# Patient Record
Sex: MALE | Race: WHITE | NOT HISPANIC OR LATINO | Employment: STUDENT | URBAN - METROPOLITAN AREA
[De-identification: names, ages, dates, MRNs, and addresses within clinical notes are randomized per-mention and may not be internally consistent; named-entity substitution may affect disease eponyms.]

---
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
## 2019-01-18 ENCOUNTER — TELEPHONE (OUTPATIENT)
Dept: PEDIATRIC NEUROLOGY | Facility: CLINIC | Age: 19
End: 2019-01-18

## 2019-01-18 NOTE — TELEPHONE ENCOUNTER
Telephoned mom she informed me Roger started to have seizures again.I offered appt 1/23@11a   Mom accept and voiced appt time and date

## 2019-01-18 NOTE — TELEPHONE ENCOUNTER
----- Message from Alexis Scanlon sent at 1/18/2019  8:10 AM CST -----  Contact: Mom [phone]  Needs Advice    Reason for call: seizures         Communication Preference: Mom [phone]    Additional Information: Mom stated that pt has started to have seizures again and feels that he needs to be seen. Mom would like a call back when possible.

## 2019-01-23 ENCOUNTER — OFFICE VISIT (OUTPATIENT)
Dept: PEDIATRIC NEUROLOGY | Facility: CLINIC | Age: 19
End: 2019-01-23
Payer: COMMERCIAL

## 2019-01-23 VITALS
SYSTOLIC BLOOD PRESSURE: 142 MMHG | BODY MASS INDEX: 20.59 KG/M2 | WEIGHT: 152 LBS | DIASTOLIC BLOOD PRESSURE: 82 MMHG | HEIGHT: 72 IN | HEART RATE: 80 BPM

## 2019-01-23 DIAGNOSIS — G40.909 SEIZURE DISORDER: Primary | ICD-10-CM

## 2019-01-23 PROCEDURE — 99214 PR OFFICE/OUTPT VISIT, EST, LEVL IV, 30-39 MIN: ICD-10-PCS | Mod: S$GLB,,, | Performed by: PSYCHIATRY & NEUROLOGY

## 2019-01-23 PROCEDURE — 99214 OFFICE O/P EST MOD 30 MIN: CPT | Mod: S$GLB,,, | Performed by: PSYCHIATRY & NEUROLOGY

## 2019-01-23 PROCEDURE — 99999 PR PBB SHADOW E&M-EST. PATIENT-LVL III: CPT | Mod: PBBFAC,,, | Performed by: PSYCHIATRY & NEUROLOGY

## 2019-01-23 PROCEDURE — 99999 PR PBB SHADOW E&M-EST. PATIENT-LVL III: ICD-10-PCS | Mod: PBBFAC,,, | Performed by: PSYCHIATRY & NEUROLOGY

## 2019-01-23 RX ORDER — LAMOTRIGINE 25 MG/1
TABLET ORAL
Qty: 120 TABLET | Refills: 11 | Status: SHIPPED | OUTPATIENT
Start: 2019-01-23 | End: 2019-03-01

## 2019-01-23 NOTE — PROGRESS NOTES
January 23, 2019    Thomas Roach M.D.  [address]  [place]  [address]    Jose Chao M.D.  [address], [address]  [place]  [address]    RE:  JAMES SARMIENTO  [facility]  [id number]     Dear Doctors Zhanna and Marley:    I saw James Sarmiento in followup at [facility] on January 23, 2019.  I have last   seen him for his seizure disorder in May 2017 and he has been seizure free for   three years and we tapered and discontinued Dilantin.  He was seizure free until   one week ago when late at night he had a generalized clonic seizure, lasting   about 2 minutes.  He states that he also had an aura in December 2018.  In the   past, his MRI and EEG have been normal.  He did not tolerate Keppra, Zonegran or   Tegretol and he said that Dilantin made him tired.  Vision, hearing, speech,   swallowing, strength and coordination are normal.  He has a great deal of   trouble with anxiety and panic attacks and sees Psychiatry and takes Cymbalta,   folate and fish oil.  He has graduated high school, but has not been able to   keep a job because of his anxiety.  No other illness, surgery, medication,   allergy or injury.    Immunizations are up-to-date.  No family history of seizures.  He lives with   both parents who are employed.    GENERAL REVIEW OF SYSTEMS:  Shows otherwise normal constitution, head, eyes,   ears, nose, throat, mouth, heart, lungs, GI, , skin, musculoskeletal,   neurologic, psychiatric, endocrine, hematologic and immune function.    PHYSICAL EXAMINATION:  VITAL SIGNS:  Weight 68.95 kilograms, height 184 cm, blood pressure 142/82,   pulse 80.  GENERAL:  Normal body habitus.  HEAD, EYES, EARS, NOSE AND THROAT:  Normal.   NECK:  Supple.  No mass.  CHEST:  Clear, no murmurs.  ABDOMEN:  Benign.  NEUROLOGIC:  Appropriate orientation, attention, language, knowledge and memory   for age.  Cranial nerves intact with normal fundi, pupils, eye movements, facial   movements,  hearing, neck and trapezius strength and tongue protrusion.  Deep   tendon reflexes are 2+, no pathologic reflexes.  Muscle tone and strength normal   in all four extremities.  Normal gait, no ataxia or intention tremor.    Sensation intact distally to touch.    In summary, Roger Putnam has had a return of his seizure disorder after four   years seizure free and off medicine.  I have ordered a repeat EEG.  I have   placed him on Lamictal 25 mg twice daily for two weeks, then 50 mg twice daily.    We will raise the dose further over time.  I cautioned Roger and his mother to   discontinue Lamictal if there is a generalized rash.  They have my card and were   asked to return or call as need be.  I will plan to see him when he returns   shortly for his EEG.    Sincerely,      RIGO  dd: 01/23/2019 11:11:55 (CST)  td: 01/24/2019 04:45:36 (CST)  Doc ID   #[id number]  Job ID #[id number]    CC:     This office note has been dictated.

## 2019-01-24 ENCOUNTER — TELEPHONE (OUTPATIENT)
Dept: PEDIATRIC NEUROLOGY | Facility: CLINIC | Age: 19
End: 2019-01-24

## 2019-01-24 NOTE — TELEPHONE ENCOUNTER
Telephoned mom to inform her Roger can not take xanax before eeg is will effect the reading  Mom voiced understanding

## 2019-01-24 NOTE — TELEPHONE ENCOUNTER
----- Message from Charity Hare sent at 1/24/2019  4:19 PM CST -----  Needs Advice    Reason for call:--Appointment--        Communication Preference:--Mom--[phone]--    Additional Information:Mom calling to see if pt can take a xanax before his EEG tomorrow. Please call to advise.

## 2019-01-25 ENCOUNTER — PROCEDURE VISIT (OUTPATIENT)
Dept: PEDIATRIC NEUROLOGY | Facility: CLINIC | Age: 19
End: 2019-01-25
Payer: COMMERCIAL

## 2019-01-25 ENCOUNTER — OFFICE VISIT (OUTPATIENT)
Dept: PEDIATRIC NEUROLOGY | Facility: CLINIC | Age: 19
End: 2019-01-25
Payer: COMMERCIAL

## 2019-01-25 VITALS
SYSTOLIC BLOOD PRESSURE: 144 MMHG | BODY MASS INDEX: 20.57 KG/M2 | DIASTOLIC BLOOD PRESSURE: 82 MMHG | HEART RATE: 86 BPM | HEIGHT: 72 IN | WEIGHT: 151.88 LBS

## 2019-01-25 DIAGNOSIS — F41.9 ANXIETY: ICD-10-CM

## 2019-01-25 DIAGNOSIS — G40.909 SEIZURE DISORDER: Primary | ICD-10-CM

## 2019-01-25 DIAGNOSIS — R94.01 ABNORMAL EEG: ICD-10-CM

## 2019-01-25 PROCEDURE — 95816 PR EEG,W/AWAKE & DROWSY RECORD: ICD-10-PCS | Mod: S$GLB,,, | Performed by: PSYCHIATRY & NEUROLOGY

## 2019-01-25 PROCEDURE — 99999 PR PBB SHADOW E&M-EST. PATIENT-LVL III: ICD-10-PCS | Mod: PBBFAC,,, | Performed by: PSYCHIATRY & NEUROLOGY

## 2019-01-25 PROCEDURE — 99999 PR PBB SHADOW E&M-EST. PATIENT-LVL III: CPT | Mod: PBBFAC,,, | Performed by: PSYCHIATRY & NEUROLOGY

## 2019-01-25 PROCEDURE — 99214 OFFICE O/P EST MOD 30 MIN: CPT | Mod: S$GLB,,, | Performed by: PSYCHIATRY & NEUROLOGY

## 2019-01-25 PROCEDURE — 99214 PR OFFICE/OUTPT VISIT, EST, LEVL IV, 30-39 MIN: ICD-10-PCS | Mod: S$GLB,,, | Performed by: PSYCHIATRY & NEUROLOGY

## 2019-01-25 PROCEDURE — 95816 EEG AWAKE AND DROWSY: CPT | Mod: S$GLB,,, | Performed by: PSYCHIATRY & NEUROLOGY

## 2019-01-25 NOTE — LETTER
January 25, 2019                   [facility]  Pediatric Neurology  [address]  [facility] [address]  Phone: [phone]   January 25, 2019     Patient: Roger Putnam   YOB: 2000   Date of Visit: 1/25/2019       To Whom it May Concern:    Roger Putnam was seen in my clinic on 1/25/2019. He may return to school on 1/28/2019.    If you have any questions or concerns, please don't hesitate to call.    Sincerely,         Foreign Fletcher MA

## 2019-01-25 NOTE — PROCEDURES
DATE OF PROCEDURE:  01/25/2019    A waking EEG with photic stimulation and hyperventilation is submitted.  The   waking posterior rhythm is 9 cycles per second.  Photic stimulation and   hyperventilation are unremarkable.  Sleep is not seen.  There are two brief   generalized bursts of 3-4 cycle per second spike and slow wave activity, but no   clinical seizures were noted.    IMPRESSION:  Abnormal EEG due to generalized spike and wave activity as   described, suggesting a generalized epilepsy.      RIGO  dd: 01/25/2019 15:19:39 (CST)  td: 01/25/2019 19:49:34 (CST)  Doc ID   #[id number]  Job ID #[id number]    CC:     This office note has been dictated.

## 2019-01-25 NOTE — PROGRESS NOTES
January 25, 2019    Thomas Roach M.D.  [address]  [place]  [address]    Jose Chao M.D.  [facility] [facility], [facility] [address]  [place]  [address]    RE:  JAMES SARMIENTO  [facility].:  [id number]    Dear Doctors Marley and Zhanna:    I saw James Sarmiento in followup at [facility] on January 25, 2019.  I last saw him   two days ago after he had a recurrence of his seizures, generalized clonic event   lasting about 2 minutes that occurred 10 days ago.  His MRI and EEG have been   normal in the past and he did not tolerate Keppra, Zonegran or Tegretol or   Dilantin.  He was placed on Lamictal, which he is now taking 25 mg twice daily   and will go up in two weeks to 50 mg twice daily.  He thinks this is helping his   anxiety and sleep.  He is also taking Cymbalta and Xanax for anxiety.  Today,   an EEG was done, which I reviewed personally:  This showed a single generalized   burst of 3-4 cycle per second spike and wave activity, consistent with a   generalized epilepsy.  No other intercurrent illness, surgery, medication,   allergy or injury.  No family history of neurologic disease.  He lives with both   parents.    GENERAL REVIEW OF SYSTEMS:  Shows otherwise normal constitution, head, eyes,   ears, nose, throat, mouth, heart, lungs, GI, , skin, musculoskeletal,   neurologic, psychiatric, endocrine, hematologic and immune function.    PHYSICAL EXAMINATION:  VITAL SIGNS:  Weight 68.9 kilograms, height 6 feet 0 inches, pulse 86, blood   pressure 144/82.  HEAD, EYES, EARS, NOSE AND THROAT:  Normal.  NECK:  Supple.  No mass.  CHEST:  Clear, no murmurs.  ABDOMEN:  Benign.  NEUROLOGIC:  Appropriate orientation, attention, language, knowledge and memory   for age.  Cranial nerves intact with normal fundi, pupils, eye movements, facial   movements, hearing, neck and trapezius strength and tongue protrusion.  Deep   tendon reflexes 2+, no pathologic reflexes.  Muscle tone and strength normal  in   all four extremities.  Normal gait, no ataxia or intention tremor.  Sensation   intact to touch.    In summary, Roger Putnam seems to be tolerating Lamictal 25 mg twice daily and   will go up in dose in about two weeks.  This seems to have helped his anxiety.    His EEG did show a generalized burst of spike and wave activity.  I have asked   him to come back to clinic in one month when we will further increase his dose   of Lamictal.    Sincerely,      RIGO  dd: 01/25/2019 15:16:50 (CST)  td: 01/26/2019 09:30:55 (CST)  Doc ID   #[id number]  Job ID #[id number]    CC:     This office note has been dictated.

## 2019-03-01 ENCOUNTER — OFFICE VISIT (OUTPATIENT)
Dept: PEDIATRIC NEUROLOGY | Facility: CLINIC | Age: 19
End: 2019-03-01
Payer: COMMERCIAL

## 2019-03-01 VITALS
HEIGHT: 72 IN | BODY MASS INDEX: 20.71 KG/M2 | DIASTOLIC BLOOD PRESSURE: 86 MMHG | WEIGHT: 152.88 LBS | SYSTOLIC BLOOD PRESSURE: 146 MMHG | HEART RATE: 72 BPM

## 2019-03-01 DIAGNOSIS — G40.909 SEIZURE DISORDER: Primary | ICD-10-CM

## 2019-03-01 PROCEDURE — 99999 PR PBB SHADOW E&M-EST. PATIENT-LVL III: CPT | Mod: PBBFAC,,, | Performed by: PSYCHIATRY & NEUROLOGY

## 2019-03-01 PROCEDURE — 99214 OFFICE O/P EST MOD 30 MIN: CPT | Mod: S$GLB,,, | Performed by: PSYCHIATRY & NEUROLOGY

## 2019-03-01 PROCEDURE — 99999 PR PBB SHADOW E&M-EST. PATIENT-LVL III: ICD-10-PCS | Mod: PBBFAC,,, | Performed by: PSYCHIATRY & NEUROLOGY

## 2019-03-01 PROCEDURE — 99214 PR OFFICE/OUTPT VISIT, EST, LEVL IV, 30-39 MIN: ICD-10-PCS | Mod: S$GLB,,, | Performed by: PSYCHIATRY & NEUROLOGY

## 2019-03-01 RX ORDER — LAMOTRIGINE 100 MG/1
TABLET ORAL
Qty: 60 TABLET | Refills: 11 | Status: SHIPPED | OUTPATIENT
Start: 2019-03-01 | End: 2019-09-05 | Stop reason: SDUPTHER

## 2019-03-01 NOTE — PROGRESS NOTES
03/01/2019    Thomas Roach M.D.  [address]  [place]  [address]    RE:  JAMES PUTNAM  [facility]  [id number]    Dear Dr. Roach:    I saw James Putnam in followup for his epilepsy on 03/01/2019.  He is now up to   50 mg twice daily of Lamictal for the last two weeks and then remained seizure   free.  This has apparently improved his anxiety as well.  He is also taking   folate and Cymbalta.  No other illness, surgery, medication, allergy or injury.    No family history of neurologic disease.  He lives with both parents.    GENERAL REVIEW OF SYSTEMS:  Shows otherwise normal constitution, head, eyes,   ears, nose, throat, mouth, heart, lungs, GI, , skin, musculoskeletal,   neurologic, psychiatric, endocrine, hematologic and immune function.    PHYSICAL EXAMINATION:  VITAL SIGNS:  Weight 69.35 kilograms, height 183 cm, blood pressure 146/86.  GENERAL:  Normal body habitus.  HEAD, EYES, EARS, NOSE AND THROAT:  Normal.  NECK:  Supple.  No mass.  CHEST:  Clear, no murmurs.  ABDOMEN:  Benign.  NEUROLOGIC:  Appropriate orientation, attention, language, knowledge and memory   for age.  Cranial nerves intact with normal fundi, pupils, eye movements, facial   movements, hearing, neck and trapezius strength and tongue protrusion.  Deep   tendon reflexes 2+ throughout, no pathologic reflexes.  Muscle tone and strength   normal in all four extremities.  Normal gait, no ataxia or intention tremor.    Sensation intact to touch.    In summary, James Putnam is doing well on Lamictal.  I have raised his dose to   what I think will be a reasonable maintenance dose of 100 mg twice daily.  I   have again warned the family to stop the drug if a generalized rash occurs.  I   will see him back in six months or sooner if there are problems.    Sincerely,      RIGO  dd: 03/01/2019 14:25:56 (CST)  td: 03/02/2019 06:31:08 (CST)  Doc ID   #[id number]  Job ID #[id number]    CC:     This office note has been  dictated.

## 2019-09-05 ENCOUNTER — OFFICE VISIT (OUTPATIENT)
Dept: PEDIATRIC NEUROLOGY | Facility: CLINIC | Age: 19
End: 2019-09-05
Payer: COMMERCIAL

## 2019-09-05 ENCOUNTER — LAB VISIT (OUTPATIENT)
Dept: LAB | Facility: HOSPITAL | Age: 19
End: 2019-09-05
Attending: PSYCHIATRY & NEUROLOGY
Payer: COMMERCIAL

## 2019-09-05 VITALS
SYSTOLIC BLOOD PRESSURE: 135 MMHG | HEIGHT: 72 IN | WEIGHT: 162.94 LBS | BODY MASS INDEX: 22.07 KG/M2 | DIASTOLIC BLOOD PRESSURE: 73 MMHG | HEART RATE: 77 BPM

## 2019-09-05 DIAGNOSIS — G40.909 SEIZURE DISORDER: Primary | ICD-10-CM

## 2019-09-05 DIAGNOSIS — G40.909 SEIZURE DISORDER: ICD-10-CM

## 2019-09-05 PROCEDURE — 99214 OFFICE O/P EST MOD 30 MIN: CPT | Mod: S$GLB,,, | Performed by: PSYCHIATRY & NEUROLOGY

## 2019-09-05 PROCEDURE — 80175 DRUG SCREEN QUAN LAMOTRIGINE: CPT

## 2019-09-05 PROCEDURE — 99999 PR PBB SHADOW E&M-EST. PATIENT-LVL III: ICD-10-PCS | Mod: PBBFAC,,, | Performed by: PSYCHIATRY & NEUROLOGY

## 2019-09-05 PROCEDURE — 36415 COLL VENOUS BLD VENIPUNCTURE: CPT

## 2019-09-05 PROCEDURE — 99214 PR OFFICE/OUTPT VISIT, EST, LEVL IV, 30-39 MIN: ICD-10-PCS | Mod: S$GLB,,, | Performed by: PSYCHIATRY & NEUROLOGY

## 2019-09-05 PROCEDURE — 99999 PR PBB SHADOW E&M-EST. PATIENT-LVL III: CPT | Mod: PBBFAC,,, | Performed by: PSYCHIATRY & NEUROLOGY

## 2019-09-05 RX ORDER — LAMOTRIGINE 100 MG/1
TABLET ORAL
Qty: 60 TABLET | Refills: 11 | Status: SHIPPED | OUTPATIENT
Start: 2019-09-05 | End: 2020-03-06 | Stop reason: SDUPTHER

## 2019-09-05 RX ORDER — AMOXICILLIN 500 MG
CAPSULE ORAL DAILY
COMMUNITY

## 2019-09-05 NOTE — PROGRESS NOTES
September 5, 2019    Thomas Roach M.D.  [address]   [place] [address]    Jose Chao MD  [address] [address]    RE:  Roger Putnam  [facility]:  [id number].    Dear Doctors Marley and Zhanna:     I saw Roger Putnam in followup for his primary generalized epilepsy (normal MRI   and exam, EEG with generalized spike and wave activity).  He has now remained   seizure free since January, taking Lamictal 100 mg twice daily.  In the past, he   failed Keppra, Zonegran, Tegretol and Dilantin.  He is planning to go to [employer]   for the winter semester.  He is seeing Psychiatry and taking folate and   Cymbalta.  No other reported illness, surgery, medication, allergy or injury.    No family history of neurologic disease.  He lives with both parents in   [place].      GENERAL REVIEW OF SYSTEMS:  Shows otherwise normal constitution, head, eyes,   ears, nose, throat, mouth, heart, lungs, GI, , skin, musculoskeletal,   neurologic, psychiatric, endocrine, hematologic and immune function.    PHYSICAL EXAMINATION:  VITAL SIGNS:  Weight 73.90 kg, height 183 cm, blood pressure 135/73.  GENERAL:  Normal body habitus.  HEAD, EYES, EARS, NOSE AND THROAT:  Normal.  NECK:  Supple.  No mass.  CHEST:  Clear.  No murmurs.  ABDOMEN:  Benign.  NEUROLOGIC:  Appropriate orientation, attention, language, knowledge and memory   for age.  Cranial nerves intact with normal fundi, pupils, eye movements, facial   movements, hearing, neck and trapezius strength and tongue protrusion.  Deep   tendon reflexes 2+, no pathologic reflexes.  Muscle tone and strength normal in   all four extremities.  Normal gait, no ataxia or intention tremor.  Sensation   intact to touch.    In summary, Roger Putnam is doing nicely on Lamictal 100 mg twice daily with no   seizures since January.  I have continued this medication, ordered a drug   level, and asked that he return in 6  months.    Sincerely,      PORTER/IN  dd: 09/05/2019 14:29:30 (CDT)  td: 09/06/2019 03:54:41 (CDT)  Doc ID   #[id number]  Job ID #[id number]    CC:     This office note has been dictated.

## 2019-09-07 LAB — LAMOTRIGINE SERPL-MCNC: 2.6 UG/ML (ref 2–15)

## 2019-12-12 ENCOUNTER — OFFICE VISIT (OUTPATIENT)
Dept: FAMILY MEDICINE | Facility: CLINIC | Age: 19
End: 2019-12-12
Payer: COMMERCIAL

## 2019-12-12 VITALS
TEMPERATURE: 98 F | DIASTOLIC BLOOD PRESSURE: 82 MMHG | OXYGEN SATURATION: 98 % | HEART RATE: 71 BPM | SYSTOLIC BLOOD PRESSURE: 118 MMHG | WEIGHT: 160.19 LBS | BODY MASS INDEX: 21.69 KG/M2

## 2019-12-12 DIAGNOSIS — F41.9 ANXIETY: ICD-10-CM

## 2019-12-12 DIAGNOSIS — Z00.00 ANNUAL PHYSICAL EXAM: ICD-10-CM

## 2019-12-12 DIAGNOSIS — G40.309 NONINTRACTABLE GENERALIZED IDIOPATHIC EPILEPSY WITHOUT STATUS EPILEPTICUS: Primary | ICD-10-CM

## 2019-12-12 DIAGNOSIS — Z23 NEED FOR PROPHYLACTIC VACCINATION AND INOCULATION AGAINST INFLUENZA: ICD-10-CM

## 2019-12-12 PROCEDURE — 90471 FLU VACCINE (QUAD) GREATER THAN OR EQUAL TO 3YO PRESERVATIVE FREE IM: ICD-10-PCS | Mod: S$GLB,,, | Performed by: INTERNAL MEDICINE

## 2019-12-12 PROCEDURE — 90472 IMMUNIZATION ADMIN EACH ADD: CPT | Mod: S$GLB,,, | Performed by: INTERNAL MEDICINE

## 2019-12-12 PROCEDURE — 99999 PR PBB SHADOW E&M-EST. PATIENT-LVL III: ICD-10-PCS | Mod: PBBFAC,,, | Performed by: INTERNAL MEDICINE

## 2019-12-12 PROCEDURE — 99999 PR PBB SHADOW E&M-EST. PATIENT-LVL III: CPT | Mod: PBBFAC,,, | Performed by: INTERNAL MEDICINE

## 2019-12-12 PROCEDURE — 99385 PREV VISIT NEW AGE 18-39: CPT | Mod: 25,S$GLB,, | Performed by: INTERNAL MEDICINE

## 2019-12-12 PROCEDURE — 99385 PR PREVENTIVE VISIT,NEW,18-39: ICD-10-PCS | Mod: 25,S$GLB,, | Performed by: INTERNAL MEDICINE

## 2019-12-12 PROCEDURE — 90686 FLU VACCINE (QUAD) GREATER THAN OR EQUAL TO 3YO PRESERVATIVE FREE IM: ICD-10-PCS | Mod: S$GLB,,, | Performed by: INTERNAL MEDICINE

## 2019-12-12 PROCEDURE — 90686 IIV4 VACC NO PRSV 0.5 ML IM: CPT | Mod: S$GLB,,, | Performed by: INTERNAL MEDICINE

## 2019-12-12 PROCEDURE — 90714 TD VACCINE GREATER THAN OR EQUAL TO 7YO PRESERVATIVE FREE IM: ICD-10-PCS | Mod: S$GLB,,, | Performed by: INTERNAL MEDICINE

## 2019-12-12 PROCEDURE — 90714 TD VACC NO PRESV 7 YRS+ IM: CPT | Mod: S$GLB,,, | Performed by: INTERNAL MEDICINE

## 2019-12-12 PROCEDURE — 90472 TD VACCINE GREATER THAN OR EQUAL TO 7YO PRESERVATIVE FREE IM: ICD-10-PCS | Mod: S$GLB,,, | Performed by: INTERNAL MEDICINE

## 2019-12-12 PROCEDURE — 90471 IMMUNIZATION ADMIN: CPT | Mod: S$GLB,,, | Performed by: INTERNAL MEDICINE

## 2019-12-12 RX ORDER — LEVOMEFOLATE CALCIUM 15 MG
15 TABLET ORAL DAILY
Qty: 90 TABLET | Refills: 3 | Status: SHIPPED | OUTPATIENT
Start: 2019-12-12 | End: 2020-12-07 | Stop reason: SDUPTHER

## 2019-12-12 RX ORDER — DULOXETIN HYDROCHLORIDE 30 MG/1
30 CAPSULE, DELAYED RELEASE ORAL DAILY
Qty: 90 CAPSULE | Refills: 3 | Status: SHIPPED | OUTPATIENT
Start: 2019-12-12 | End: 2020-12-07 | Stop reason: SDUPTHER

## 2019-12-12 NOTE — PROGRESS NOTES
[facility] Note    Chief Complaint      Chief Complaint   Patient presents with    Establish Care       History of Present Illness      Roger Putnam is a 19 y.o. male with chronic conditions of epilepsy, depression, ADHD who presents today for: establish care and annual preventative visit.  Epilepsy: Sees Dr. Mack, pediatric neurology.  On lamotrigine.  No seizure since starting lamictal 1/17/2019.    Depression, anxiety, ADHD: Sees Dr. Chao.  On cymbalta, folic acid and xanax as needed.  Doing well on this regimen.    Diet: Prepares own food mostly.  Limiting fatty foods and carbs.  Drinks plenty water.    Exercise: runs 2.5 miles 4-5x/week.  Denies drinking and driving, drinking more than 4 drinks on occasion, drug use.     Flu shot due.  Td 2009, due for repeat.      Past Medical History:  Past Medical History:   Diagnosis Date    ADHD (attention deficit hyperactivity disorder)     Panic attack     Seizures     Social anxiety disorder        Past Surgical History:   has no past surgical history on file.    Family History:  family history includes Alcohol abuse in his maternal grandmother; Arthritis in his mother; Asthma in his brother; COPD in his paternal grandmother; Cancer in his father; Drug abuse in his paternal uncle; Miscarriages / Stillbirths in his mother.     Social History:  Social History     Tobacco Use    Smoking status: Never Smoker    Smokeless tobacco: Never Used   Substance Use Topics    Alcohol use: No     Alcohol/week: 0.0 standard drinks     Frequency: Monthly or less     Drinks per session: 1 or 2     Binge frequency: Never    Drug use: No       Review of Systems   Constitutional: Negative for chills, fever and malaise/fatigue.   HENT: Negative for hearing loss.    Eyes: Negative for discharge.   Respiratory: Negative for shortness of breath and wheezing.    Cardiovascular: Negative for chest pain and palpitations.   Gastrointestinal: Negative for blood in  stool, constipation, diarrhea, nausea and vomiting.   Genitourinary: Negative for hematuria and urgency.   Musculoskeletal: Negative for neck pain.   Skin: Negative for rash.   Neurological: Negative for weakness and headaches.   Endo/Heme/Allergies: Negative for polydipsia.        Medications:  Outpatient Encounter Medications as of 12/12/2019   Medication Sig Dispense Refill    alprazolam (XANAX) 0.25 MG tablet Take 0.25 mg by mouth 3 (three) times daily. Take only when needed for Anxiety      DULoxetine (CYMBALTA) 30 MG capsule Take 1 capsule (30 mg total) by mouth once daily. 90 capsule 3    fish oil-omega-3 fatty acids 300-1,000 mg capsule Take by mouth once daily.      fluticasone (FLONASE) 50 mcg/actuation nasal spray       lamoTRIgine (LAMICTAL) 100 MG tablet One twice daily.  Stop if a rash occurs 60 tablet 11    levomefolate calcium (L-METHYLFOLATE) 15 mg Tab Take 15 mg by mouth once daily. 90 tablet 3    [DISCONTINUED] duloxetine (CYMBALTA) 20 MG capsule Take 30 mg by mouth once daily.       [DISCONTINUED] levomefolate calcium (L-METHYLFOLATE) 15 mg Tab Take 15 mg by mouth once daily.       No facility-administered encounter medications on file as of 12/12/2019.        Allergies:  Review of patient's allergies indicates:  No Known Allergies    Health Maintenance:  Immunization History   Administered Date(s) Administered    DTaP (5 Pertussis Antigens) 2000, 2000, 1998, 11/22/1999, 07/30/2002    HIB 2000, 2000, 1998, 11/22/1999    Hepatitis A, Pediatric/Adolescent, 2 Dose 06/24/2016    Hepatitis B 2000, 2000, 02/18/1999    IPV 1998, 1998, 07/30/2002    MMR 04/26/1999, 07/30/2002    Meningococcal Conjugate (MCV4P) 07/20/2009, 06/24/2016    OPV 04/26/1999    PPD Test 02/18/1999    Tdap 07/20/2009    Varicella 04/26/1999, 07/20/2009      Health Maintenance   Topic Date Due    Lipid Panel  1998    HPV Vaccines (1 - Male  3-dose series) 04/02/2013    TETANUS VACCINE  04/02/2016        Physical Exam      Vital Signs  Temp: 98.1 °F (36.7 °C)  Temp src: Oral  Pulse: 71  SpO2: 98 %  BP: 118/82  BP Location: Right arm  Patient Position: Sitting  Pain Score: 0-No pain  Height and Weight  Weight: 72.7 kg (160 lb 2.6 oz)]    Physical Exam   Constitutional: He appears well-developed and well-nourished.   HENT:   Head: Normocephalic and atraumatic.   Right Ear: External ear normal.   Left Ear: External ear normal.   Mouth/Throat: Oropharynx is clear and moist.   Eyes: Pupils are equal, round, and reactive to light. Conjunctivae and EOM are normal.   Cardiovascular: Normal rate, regular rhythm, normal heart sounds and intact distal pulses.   No murmur heard.  Pulmonary/Chest: Effort normal and breath sounds normal. He has no wheezes. He has no rales.   Abdominal: Soft. Bowel sounds are normal. He exhibits no distension and no abdominal bruit. There is no hepatosplenomegaly. There is no tenderness.   Vitals reviewed.       Laboratory:  CBC:      CMP:        Invalid input(s): CREATININ  URINALYSIS:       LIPIDS:      TSH:      A1C:        Assessment/Plan     Roger Putnam is a 19 y.o.male with:    1. Annual physical exam  Discussed diet and exercise, vaccines and cancer screening, risk factors.  Screening labs ordered.     2. Nonintractable generalized idiopathic epilepsy without status epilepticus  Continue current meds.  F/U with Dr. Mack  3. Anxiety  - levomefolate calcium (L-METHYLFOLATE) 15 mg Tab; Take 15 mg by mouth once daily.  Dispense: 90 tablet; Refill: 3  - DULoxetine (CYMBALTA) 30 MG capsule; Take 1 capsule (30 mg total) by mouth once daily.  Dispense: 90 capsule; Refill: 3  Continue current meds.    4. Need for prophylactic vaccination and inoculation against influenza  - Flu Vaccine - Quadrivalent (PF) (6 months & older)  - Td Vaccine (Adult) - Preservative Free       Chronic conditions status updated as per HPI.  Other than  changes above, cont current medications and maintain follow up with specialists.  Return to clinic in 12 months.    Devin Frias MD  [facility]                Answers for HPI/ROS submitted by the patient on 12/11/2019   activity change: No  unexpected weight change: No  rhinorrhea: No  trouble swallowing: No  visual disturbance: No  chest tightness: No  polyuria: No  difficulty urinating: No  joint swelling: No  arthralgias: No  confusion: No  dysphoric mood: No    Flu consent signed by patient. Flu vaccine administered.

## 2020-03-06 ENCOUNTER — OFFICE VISIT (OUTPATIENT)
Dept: PEDIATRIC NEUROLOGY | Facility: CLINIC | Age: 20
End: 2020-03-06
Payer: COMMERCIAL

## 2020-03-06 VITALS
HEIGHT: 72 IN | HEART RATE: 72 BPM | BODY MASS INDEX: 21.48 KG/M2 | SYSTOLIC BLOOD PRESSURE: 139 MMHG | DIASTOLIC BLOOD PRESSURE: 84 MMHG | WEIGHT: 158.63 LBS

## 2020-03-06 DIAGNOSIS — G40.909 SEIZURE DISORDER: Primary | ICD-10-CM

## 2020-03-06 PROCEDURE — 99214 PR OFFICE/OUTPT VISIT, EST, LEVL IV, 30-39 MIN: ICD-10-PCS | Mod: S$GLB,,, | Performed by: PSYCHIATRY & NEUROLOGY

## 2020-03-06 PROCEDURE — 99214 OFFICE O/P EST MOD 30 MIN: CPT | Mod: S$GLB,,, | Performed by: PSYCHIATRY & NEUROLOGY

## 2020-03-06 PROCEDURE — 99999 PR PBB SHADOW E&M-EST. PATIENT-LVL III: CPT | Mod: PBBFAC,,, | Performed by: PSYCHIATRY & NEUROLOGY

## 2020-03-06 PROCEDURE — 99999 PR PBB SHADOW E&M-EST. PATIENT-LVL III: ICD-10-PCS | Mod: PBBFAC,,, | Performed by: PSYCHIATRY & NEUROLOGY

## 2020-03-06 RX ORDER — LAMOTRIGINE 100 MG/1
TABLET ORAL
Qty: 60 TABLET | Refills: 11 | Status: SHIPPED | OUTPATIENT
Start: 2020-03-06 | End: 2021-03-22 | Stop reason: SDUPTHER

## 2020-03-06 NOTE — PROGRESS NOTES
Dictation #1  MRN:[id number]  [facility]:[id number]  Pediatric Neurology Clinic note 03/06/2020  Roger Putnam date of birth 2000    This is a 19-year-old with an inactive primary generalized epilepsy:  Normal MRI of the cranium and an EEG with generalized spike and wave activity.  He is now taking Lamictal 100 mg twice daily and has had no seizures in 1 year.  In the past he was treated with Keppra zonegram Tegretol and Dilantin.  He is in college.  He is taking Cymbalta and folate.  No other inter current illness surgery medication allergy or injury.  No family history of neurologic disease.  He lives with his parents.  General review of systems is benign.    Weight 71.95 kg height 183.5 cm blood pressure 139/84 normal body habitus.  Head eyes ears nose and throat were normal.  Neck is supple no masses chest clear no murmurs abdomen benign.  Appropriate mental state for age.  Cranial nerves intact with normal fundi pupils eye movements facial movements hearing neck trapezius strength and tongue protrusion.  His deep tendon reflexes are 2+, no pathologic reflexes.  Muscle tone and strength normal. Normal gait, no ataxia.  Sensation intact to touch.    I will continue Lamictal 100 mg twice daily and see him back in 1 year, or sooner if there are problems--Victor Manuel Mack MD

## 2020-12-07 ENCOUNTER — OFFICE VISIT (OUTPATIENT)
Dept: PRIMARY CARE CLINIC | Facility: CLINIC | Age: 20
End: 2020-12-07
Payer: COMMERCIAL

## 2020-12-07 VITALS
HEIGHT: 72 IN | BODY MASS INDEX: 21.35 KG/M2 | SYSTOLIC BLOOD PRESSURE: 116 MMHG | OXYGEN SATURATION: 99 % | DIASTOLIC BLOOD PRESSURE: 80 MMHG | HEART RATE: 73 BPM | WEIGHT: 157.63 LBS

## 2020-12-07 DIAGNOSIS — F90.9 ATTENTION DEFICIT HYPERACTIVITY DISORDER (ADHD), UNSPECIFIED ADHD TYPE: ICD-10-CM

## 2020-12-07 DIAGNOSIS — Z00.00 ANNUAL PHYSICAL EXAM: Primary | ICD-10-CM

## 2020-12-07 DIAGNOSIS — G40.309 NONINTRACTABLE GENERALIZED IDIOPATHIC EPILEPSY WITHOUT STATUS EPILEPTICUS: ICD-10-CM

## 2020-12-07 DIAGNOSIS — F41.9 ANXIETY: ICD-10-CM

## 2020-12-07 PROCEDURE — 99999 PR PBB SHADOW E&M-EST. PATIENT-LVL III: CPT | Mod: PBBFAC,,, | Performed by: INTERNAL MEDICINE

## 2020-12-07 PROCEDURE — 99213 OFFICE O/P EST LOW 20 MIN: CPT | Mod: PBBFAC,PN | Performed by: INTERNAL MEDICINE

## 2020-12-07 PROCEDURE — 99999 PR PBB SHADOW E&M-EST. PATIENT-LVL III: ICD-10-PCS | Mod: PBBFAC,,, | Performed by: INTERNAL MEDICINE

## 2020-12-07 PROCEDURE — 99395 PREV VISIT EST AGE 18-39: CPT | Mod: S$PBB,,, | Performed by: INTERNAL MEDICINE

## 2020-12-07 PROCEDURE — 99395 PR PREVENTIVE VISIT,EST,18-39: ICD-10-PCS | Mod: S$PBB,,, | Performed by: INTERNAL MEDICINE

## 2020-12-07 RX ORDER — LEVOMEFOLATE CALCIUM 15 MG
15 TABLET ORAL DAILY
Qty: 90 TABLET | Refills: 3 | Status: SHIPPED | OUTPATIENT
Start: 2020-12-07 | End: 2021-12-21 | Stop reason: SDUPTHER

## 2020-12-07 RX ORDER — DULOXETIN HYDROCHLORIDE 30 MG/1
30 CAPSULE, DELAYED RELEASE ORAL DAILY
Qty: 90 CAPSULE | Refills: 3 | Status: SHIPPED | OUTPATIENT
Start: 2020-12-07 | End: 2021-12-08 | Stop reason: SDUPTHER

## 2020-12-07 NOTE — PROGRESS NOTES
[facility] Note    Chief Complaint      Chief Complaint   Patient presents with    Annual Exam       History of Present Illness      Roger Putnam is a 20 y.o. male with chronic conditions of seizure disorder, anxiety, ADHD who presents today for: annual preventative visit.   Epilepsy: Sees Dr. Mack, pediatric neurology.  On lamotrigine.  No seizure since starting lamictal 1/17/2019.    Depression, anxiety, ADHD: Previously seen Dr. Chao, psychiatrist, Dr. Reyes, psychology.  On cymbalta, folic acid and xanax as needed.  Doing well on this regimen.    Diet: Prepares own food mostly.  Limiting fatty foods and carbs.  Drinks plenty water.    Exercise: runs 3 miles 4-5x/week.  Denies drinking and driving, drinking more than 4 drinks on occasion, drug use.     Flu shot UTD.  Td 2019.     Past Medical History:  Past Medical History:   Diagnosis Date    ADHD (attention deficit hyperactivity disorder)     Panic attack     Seizures     Social anxiety disorder        Past Surgical History:   has no past surgical history on file.    Family History:  family history includes Alcohol abuse in his maternal grandmother; Arthritis in his mother; Asthma in his brother; COPD in his paternal grandmother; Cancer in his father; Drug abuse in his paternal uncle; Miscarriages / Stillbirths in his mother.     Social History:  Social History     Tobacco Use    Smoking status: Never Smoker    Smokeless tobacco: Never Used   Substance Use Topics    Alcohol use: No     Alcohol/week: 0.0 standard drinks     Frequency: Monthly or less     Drinks per session: 1 or 2     Binge frequency: Never    Drug use: No       I personally reviewed all past medical, surgical, social and family history.    Review of Systems   Constitutional: Negative for chills, fever and malaise/fatigue.   Respiratory: Negative for shortness of breath.    Cardiovascular: Negative for chest pain.   Gastrointestinal: Negative for constipation,  diarrhea, nausea and vomiting.   Skin: Negative for rash.   Neurological: Negative for weakness.   All other systems reviewed and are negative.       Medications:  Outpatient Encounter Medications as of 12/7/2020   Medication Sig Dispense Refill    alprazolam (XANAX) 0.25 MG tablet Take 0.25 mg by mouth 3 (three) times daily. Take only when needed for Anxiety      DULoxetine (CYMBALTA) 30 MG capsule Take 1 capsule (30 mg total) by mouth once daily. 90 capsule 3    fish oil-omega-3 fatty acids 300-1,000 mg capsule Take by mouth once daily.      fluticasone (FLONASE) 50 mcg/actuation nasal spray       lamoTRIgine (LAMICTAL) 100 MG tablet One twice daily.  Stop if a rash occurs 60 tablet 11    levomefolate calcium (L-METHYLFOLATE) 15 mg Tab Take 15 mg by mouth once daily. 90 tablet 3     No facility-administered encounter medications on file as of 12/7/2020.        Allergies:  Review of patient's allergies indicates:  No Known Allergies    Health Maintenance:  Immunization History   Administered Date(s) Administered    DTaP (5 Pertussis Antigens) 1998, 1998, 1998, 11/22/1999, 07/30/2002    HIB 1998, 1998, 1998, 11/22/1999    Hepatitis A, Pediatric/Adolescent, 2 Dose 06/24/2016    Hepatitis B 1998, 1998, 02/18/1999    IPV 1998, 1998, 07/30/2002    Influenza - Quadrivalent - PF *Preferred* (6 months and older) 12/12/2019, 09/07/2020    MMR 04/26/1999, 07/30/2002    Meningococcal Conjugate (MCV4P) 07/20/2009, 06/24/2016    OPV 04/26/1999    PPD Test 02/18/1999    Td - PF (ADULT) 12/12/2019    Tdap 07/20/2009    Varicella 04/26/1999, 07/20/2009      Health Maintenance   Topic Date Due    Hepatitis C Screening  1998    Lipid Panel  1998    HPV Vaccines (1 - Male 2-dose series) 04/02/2009    TETANUS VACCINE  12/12/2029        Physical Exam      Vital Signs  Pulse: 73  SpO2: 99 %  BP: 116/80  BP Location: Right arm  Height and  Weight  Height: 6' (182.9 cm)  Weight: 71.5 kg (157 lb 10.1 oz)  BSA (Calculated - sq m): 1.91 sq meters  BMI (Calculated): 21.4  Weight in (lb) to have BMI = 25: 183.9]    Physical Exam  Vitals signs reviewed.   Constitutional:       Appearance: He is well-developed.   HENT:      Head: Normocephalic and atraumatic.      Right Ear: External ear normal.      Left Ear: External ear normal.   Eyes:      Conjunctiva/sclera: Conjunctivae normal.      Pupils: Pupils are equal, round, and reactive to light.   Cardiovascular:      Rate and Rhythm: Normal rate and regular rhythm.      Heart sounds: Normal heart sounds. No murmur.   Pulmonary:      Effort: Pulmonary effort is normal.      Breath sounds: Normal breath sounds. No wheezing or rales.   Abdominal:      General: Bowel sounds are normal. There is no distension or abdominal bruit.      Palpations: Abdomen is soft.      Tenderness: There is no abdominal tenderness.          Laboratory:  CBC:      CMP:        Invalid input(s): CREATININ  URINALYSIS:       LIPIDS:      TSH:      A1C:        Assessment/Plan     Roger Putnam is a 20 y.o.male with:    1. Annual physical exam  Discussed diet and exercise, vaccines and cancer screening, risk factors.  Screening labs deferred.  2. Nonintractable generalized idiopathic epilepsy without status epilepticus  Continue current meds.  F/U with neurology  3. Anxiety  4. Attention deficit hyperactivity disorder (ADHD), unspecified ADHD type  Continue current meds.  F/U with psychology.     Chronic conditions status updated as per HPI.  Other than changes above, cont current medications and maintain follow up with specialists.  Return to clinic in 12 months.    Devin Frias MD  [facility]

## 2021-03-22 ENCOUNTER — PATIENT MESSAGE (OUTPATIENT)
Dept: PRIMARY CARE CLINIC | Facility: CLINIC | Age: 21
End: 2021-03-22

## 2021-03-22 DIAGNOSIS — G40.909 SEIZURE DISORDER: ICD-10-CM

## 2021-03-22 RX ORDER — LAMOTRIGINE 100 MG/1
100 TABLET ORAL 2 TIMES DAILY
Qty: 60 TABLET | Refills: 4 | Status: SHIPPED | OUTPATIENT
Start: 2021-03-22 | End: 2021-06-28 | Stop reason: SDUPTHER

## 2021-03-23 ENCOUNTER — PATIENT MESSAGE (OUTPATIENT)
Dept: PRIMARY CARE CLINIC | Facility: CLINIC | Age: 21
End: 2021-03-23

## 2021-03-27 ENCOUNTER — IMMUNIZATION (OUTPATIENT)
Dept: PRIMARY CARE CLINIC | Facility: CLINIC | Age: 21
End: 2021-03-27
Payer: COMMERCIAL

## 2021-03-27 DIAGNOSIS — Z23 NEED FOR VACCINATION: Primary | ICD-10-CM

## 2021-03-27 PROCEDURE — 0001A PR IMMUNIZ ADMIN, SARS-COV-2 COVID-19 VACC, 30MCG/0.3ML, 1ST DOSE: ICD-10-PCS | Mod: CV19,S$GLB,, | Performed by: INTERNAL MEDICINE

## 2021-03-27 PROCEDURE — 0001A PR IMMUNIZ ADMIN, SARS-COV-2 COVID-19 VACC, 30MCG/0.3ML, 1ST DOSE: CPT | Mod: CV19,S$GLB,, | Performed by: INTERNAL MEDICINE

## 2021-03-27 PROCEDURE — 91300 PR SARS-COV- 2 COVID-19 VACCINE, NO PRSV, 30MCG/0.3ML, IM: ICD-10-PCS | Mod: S$GLB,,, | Performed by: INTERNAL MEDICINE

## 2021-03-27 PROCEDURE — 91300 PR SARS-COV- 2 COVID-19 VACCINE, NO PRSV, 30MCG/0.3ML, IM: CPT | Mod: S$GLB,,, | Performed by: INTERNAL MEDICINE

## 2021-03-27 RX ADMIN — Medication 0.3 ML: at 02:03

## 2021-04-17 ENCOUNTER — IMMUNIZATION (OUTPATIENT)
Dept: PRIMARY CARE CLINIC | Facility: CLINIC | Age: 21
End: 2021-04-17
Payer: COMMERCIAL

## 2021-04-17 DIAGNOSIS — Z23 NEED FOR VACCINATION: Primary | ICD-10-CM

## 2021-04-17 PROCEDURE — 91300 PR SARS-COV- 2 COVID-19 VACCINE, NO PRSV, 30MCG/0.3ML, IM: ICD-10-PCS | Mod: S$GLB,,, | Performed by: INTERNAL MEDICINE

## 2021-04-17 PROCEDURE — 0002A PR IMMUNIZ ADMIN, SARS-COV-2 COVID-19 VACC, 30MCG/0.3ML, 2ND DOSE: ICD-10-PCS | Mod: CV19,S$GLB,, | Performed by: INTERNAL MEDICINE

## 2021-04-17 PROCEDURE — 0002A PR IMMUNIZ ADMIN, SARS-COV-2 COVID-19 VACC, 30MCG/0.3ML, 2ND DOSE: CPT | Mod: CV19,S$GLB,, | Performed by: INTERNAL MEDICINE

## 2021-04-17 PROCEDURE — 91300 PR SARS-COV- 2 COVID-19 VACCINE, NO PRSV, 30MCG/0.3ML, IM: CPT | Mod: S$GLB,,, | Performed by: INTERNAL MEDICINE

## 2021-04-17 RX ADMIN — Medication 0.3 ML: at 10:04

## 2021-06-25 ENCOUNTER — PATIENT OUTREACH (OUTPATIENT)
Dept: ADMINISTRATIVE | Facility: OTHER | Age: 21
End: 2021-06-25

## 2021-06-28 ENCOUNTER — OFFICE VISIT (OUTPATIENT)
Dept: NEUROLOGY | Facility: CLINIC | Age: 21
End: 2021-06-28
Payer: COMMERCIAL

## 2021-06-28 DIAGNOSIS — G40.909 SEIZURE DISORDER: ICD-10-CM

## 2021-06-28 PROCEDURE — 99205 OFFICE O/P NEW HI 60 MIN: CPT | Mod: 95,,, | Performed by: PSYCHIATRY & NEUROLOGY

## 2021-06-28 PROCEDURE — 99205 PR OFFICE/OUTPT VISIT, NEW, LEVL V, 60-74 MIN: ICD-10-PCS | Mod: 95,,, | Performed by: PSYCHIATRY & NEUROLOGY

## 2021-06-28 RX ORDER — LAMOTRIGINE 100 MG/1
100 TABLET ORAL 2 TIMES DAILY
Qty: 180 TABLET | Refills: 3 | Status: SHIPPED | OUTPATIENT
Start: 2021-06-28 | End: 2022-05-25 | Stop reason: SDUPTHER

## 2021-12-08 ENCOUNTER — PATIENT MESSAGE (OUTPATIENT)
Dept: INTERNAL MEDICINE | Facility: CLINIC | Age: 21
End: 2021-12-08
Payer: COMMERCIAL

## 2021-12-08 DIAGNOSIS — F41.9 ANXIETY: ICD-10-CM

## 2021-12-08 RX ORDER — DULOXETIN HYDROCHLORIDE 30 MG/1
30 CAPSULE, DELAYED RELEASE ORAL DAILY
Qty: 90 CAPSULE | Refills: 3 | Status: SHIPPED | OUTPATIENT
Start: 2021-12-08 | End: 2021-12-09

## 2021-12-09 DIAGNOSIS — F41.9 ANXIETY: ICD-10-CM

## 2021-12-09 RX ORDER — DULOXETIN HYDROCHLORIDE 30 MG/1
CAPSULE, DELAYED RELEASE ORAL
Qty: 90 CAPSULE | Refills: 3 | Status: SHIPPED | OUTPATIENT
Start: 2021-12-09 | End: 2023-02-24

## 2021-12-21 ENCOUNTER — OFFICE VISIT (OUTPATIENT)
Dept: INTERNAL MEDICINE | Facility: CLINIC | Age: 21
End: 2021-12-21
Payer: COMMERCIAL

## 2021-12-21 VITALS
OXYGEN SATURATION: 98 % | SYSTOLIC BLOOD PRESSURE: 132 MMHG | HEART RATE: 72 BPM | BODY MASS INDEX: 21.78 KG/M2 | HEIGHT: 72 IN | TEMPERATURE: 99 F | DIASTOLIC BLOOD PRESSURE: 80 MMHG | WEIGHT: 160.81 LBS

## 2021-12-21 DIAGNOSIS — Z00.00 ANNUAL PHYSICAL EXAM: Primary | ICD-10-CM

## 2021-12-21 DIAGNOSIS — F41.9 ANXIETY: ICD-10-CM

## 2021-12-21 DIAGNOSIS — G40.309 NONINTRACTABLE GENERALIZED IDIOPATHIC EPILEPSY WITHOUT STATUS EPILEPTICUS: ICD-10-CM

## 2021-12-21 DIAGNOSIS — F90.9 ATTENTION DEFICIT HYPERACTIVITY DISORDER (ADHD), UNSPECIFIED ADHD TYPE: ICD-10-CM

## 2021-12-21 DIAGNOSIS — Z79.899 HIGH RISK MEDICATION USE: ICD-10-CM

## 2021-12-21 PROCEDURE — 99395 PR PREVENTIVE VISIT,EST,18-39: ICD-10-PCS | Mod: S$GLB,,, | Performed by: INTERNAL MEDICINE

## 2021-12-21 PROCEDURE — 99999 PR PBB SHADOW E&M-EST. PATIENT-LVL IV: CPT | Mod: PBBFAC,,, | Performed by: INTERNAL MEDICINE

## 2021-12-21 PROCEDURE — 99999 PR PBB SHADOW E&M-EST. PATIENT-LVL IV: ICD-10-PCS | Mod: PBBFAC,,, | Performed by: INTERNAL MEDICINE

## 2021-12-21 PROCEDURE — 99395 PREV VISIT EST AGE 18-39: CPT | Mod: S$GLB,,, | Performed by: INTERNAL MEDICINE

## 2021-12-21 RX ORDER — LEVOMEFOLATE CALCIUM 15 MG
15 TABLET ORAL DAILY
Qty: 90 TABLET | Refills: 3 | Status: SHIPPED | OUTPATIENT
Start: 2021-12-21 | End: 2021-12-27

## 2022-01-11 ENCOUNTER — LAB VISIT (OUTPATIENT)
Dept: LAB | Facility: HOSPITAL | Age: 22
End: 2022-01-11
Attending: INTERNAL MEDICINE
Payer: COMMERCIAL

## 2022-01-11 DIAGNOSIS — Z79.899 HIGH RISK MEDICATION USE: ICD-10-CM

## 2022-01-11 DIAGNOSIS — G40.309 NONINTRACTABLE GENERALIZED IDIOPATHIC EPILEPSY WITHOUT STATUS EPILEPTICUS: ICD-10-CM

## 2022-01-11 DIAGNOSIS — Z00.00 ANNUAL PHYSICAL EXAM: ICD-10-CM

## 2022-01-11 LAB
ALBUMIN SERPL BCP-MCNC: 4.4 G/DL (ref 3.5–5.2)
ALP SERPL-CCNC: 60 U/L (ref 55–135)
ALT SERPL W/O P-5'-P-CCNC: 12 U/L (ref 10–44)
ANION GAP SERPL CALC-SCNC: 9 MMOL/L (ref 8–16)
AST SERPL-CCNC: 21 U/L (ref 10–40)
BASOPHILS # BLD AUTO: 0.02 K/UL (ref 0–0.2)
BASOPHILS NFR BLD: 0.3 % (ref 0–1.9)
BILIRUB SERPL-MCNC: 0.9 MG/DL (ref 0.1–1)
BUN SERPL-MCNC: 13 MG/DL (ref 6–20)
CALCIUM SERPL-MCNC: 10 MG/DL (ref 8.7–10.5)
CHLORIDE SERPL-SCNC: 102 MMOL/L (ref 95–110)
CHOLEST SERPL-MCNC: 155 MG/DL (ref 120–199)
CHOLEST/HDLC SERPL: 3.2 {RATIO} (ref 2–5)
CO2 SERPL-SCNC: 29 MMOL/L (ref 23–29)
CREAT SERPL-MCNC: 0.9 MG/DL (ref 0.5–1.4)
DIFFERENTIAL METHOD: ABNORMAL
EOSINOPHIL # BLD AUTO: 0.1 K/UL (ref 0–0.5)
EOSINOPHIL NFR BLD: 1.2 % (ref 0–8)
ERYTHROCYTE [DISTWIDTH] IN BLOOD BY AUTOMATED COUNT: 11.9 % (ref 11.5–14.5)
EST. GFR  (AFRICAN AMERICAN): >60 ML/MIN/1.73 M^2
EST. GFR  (NON AFRICAN AMERICAN): >60 ML/MIN/1.73 M^2
GLUCOSE SERPL-MCNC: 95 MG/DL (ref 70–110)
HCT VFR BLD AUTO: 51.1 % (ref 40–54)
HDLC SERPL-MCNC: 49 MG/DL (ref 40–75)
HDLC SERPL: 31.6 % (ref 20–50)
HGB BLD-MCNC: 17.6 G/DL (ref 14–18)
IMM GRANULOCYTES # BLD AUTO: 0.01 K/UL (ref 0–0.04)
IMM GRANULOCYTES NFR BLD AUTO: 0.2 % (ref 0–0.5)
LDLC SERPL CALC-MCNC: 90.8 MG/DL (ref 63–159)
LYMPHOCYTES # BLD AUTO: 1.4 K/UL (ref 1–4.8)
LYMPHOCYTES NFR BLD: 24.3 % (ref 18–48)
MCH RBC QN AUTO: 32.2 PG (ref 27–31)
MCHC RBC AUTO-ENTMCNC: 34.4 G/DL (ref 32–36)
MCV RBC AUTO: 93 FL (ref 82–98)
MONOCYTES # BLD AUTO: 0.4 K/UL (ref 0.3–1)
MONOCYTES NFR BLD: 7.1 % (ref 4–15)
NEUTROPHILS # BLD AUTO: 3.9 K/UL (ref 1.8–7.7)
NEUTROPHILS NFR BLD: 66.9 % (ref 38–73)
NONHDLC SERPL-MCNC: 106 MG/DL
NRBC BLD-RTO: 0 /100 WBC
PLATELET # BLD AUTO: 237 K/UL (ref 150–450)
PMV BLD AUTO: 11.7 FL (ref 9.2–12.9)
POTASSIUM SERPL-SCNC: 4.7 MMOL/L (ref 3.5–5.1)
PROT SERPL-MCNC: 7.3 G/DL (ref 6–8.4)
RBC # BLD AUTO: 5.47 M/UL (ref 4.6–6.2)
SODIUM SERPL-SCNC: 140 MMOL/L (ref 136–145)
TRIGL SERPL-MCNC: 76 MG/DL (ref 30–150)
WBC # BLD AUTO: 5.77 K/UL (ref 3.9–12.7)

## 2022-01-11 PROCEDURE — 85025 COMPLETE CBC W/AUTO DIFF WBC: CPT | Performed by: INTERNAL MEDICINE

## 2022-01-11 PROCEDURE — 36415 COLL VENOUS BLD VENIPUNCTURE: CPT | Performed by: INTERNAL MEDICINE

## 2022-01-11 PROCEDURE — 80061 LIPID PANEL: CPT | Performed by: INTERNAL MEDICINE

## 2022-01-11 PROCEDURE — 80053 COMPREHEN METABOLIC PANEL: CPT | Performed by: INTERNAL MEDICINE

## 2022-01-11 PROCEDURE — 80175 DRUG SCREEN QUAN LAMOTRIGINE: CPT | Performed by: INTERNAL MEDICINE

## 2022-01-13 ENCOUNTER — PATIENT MESSAGE (OUTPATIENT)
Dept: INTERNAL MEDICINE | Facility: CLINIC | Age: 22
End: 2022-01-13
Payer: COMMERCIAL

## 2022-01-14 LAB — LAMOTRIGINE SERPL-MCNC: 2.8 UG/ML (ref 2–15)

## 2022-04-13 ENCOUNTER — OFFICE VISIT (OUTPATIENT)
Dept: URGENT CARE | Facility: CLINIC | Age: 22
End: 2022-04-13
Payer: COMMERCIAL

## 2022-04-13 VITALS
WEIGHT: 160 LBS | DIASTOLIC BLOOD PRESSURE: 77 MMHG | OXYGEN SATURATION: 97 % | RESPIRATION RATE: 18 BRPM | HEART RATE: 76 BPM | BODY MASS INDEX: 21.67 KG/M2 | TEMPERATURE: 98 F | SYSTOLIC BLOOD PRESSURE: 122 MMHG | HEIGHT: 72 IN

## 2022-04-13 DIAGNOSIS — J32.9 SINUSITIS, UNSPECIFIED CHRONICITY, UNSPECIFIED LOCATION: ICD-10-CM

## 2022-04-13 DIAGNOSIS — R05.9 COUGH: Primary | ICD-10-CM

## 2022-04-13 LAB
CTP QC/QA: YES
POC MOLECULAR INFLUENZA A AGN: NEGATIVE
POC MOLECULAR INFLUENZA B AGN: NEGATIVE

## 2022-04-13 PROCEDURE — 3074F PR MOST RECENT SYSTOLIC BLOOD PRESSURE < 130 MM HG: ICD-10-PCS | Mod: CPTII,S$GLB,,

## 2022-04-13 PROCEDURE — 87502 POCT INFLUENZA A/B MOLECULAR: ICD-10-PCS | Mod: QW,S$GLB,,

## 2022-04-13 PROCEDURE — 3078F PR MOST RECENT DIASTOLIC BLOOD PRESSURE < 80 MM HG: ICD-10-PCS | Mod: CPTII,S$GLB,,

## 2022-04-13 PROCEDURE — 3074F SYST BP LT 130 MM HG: CPT | Mod: CPTII,S$GLB,,

## 2022-04-13 PROCEDURE — 3008F PR BODY MASS INDEX (BMI) DOCUMENTED: ICD-10-PCS | Mod: CPTII,S$GLB,,

## 2022-04-13 PROCEDURE — 87502 INFLUENZA DNA AMP PROBE: CPT | Mod: QW,S$GLB,,

## 2022-04-13 PROCEDURE — 3078F DIAST BP <80 MM HG: CPT | Mod: CPTII,S$GLB,,

## 2022-04-13 PROCEDURE — 99213 PR OFFICE/OUTPT VISIT, EST, LEVL III, 20-29 MIN: ICD-10-PCS | Mod: S$GLB,,,

## 2022-04-13 PROCEDURE — 99213 OFFICE O/P EST LOW 20 MIN: CPT | Mod: S$GLB,,,

## 2022-04-13 PROCEDURE — 1159F MED LIST DOCD IN RCRD: CPT | Mod: CPTII,S$GLB,,

## 2022-04-13 PROCEDURE — 1159F PR MEDICATION LIST DOCUMENTED IN MEDICAL RECORD: ICD-10-PCS | Mod: CPTII,S$GLB,,

## 2022-04-13 PROCEDURE — 3008F BODY MASS INDEX DOCD: CPT | Mod: CPTII,S$GLB,,

## 2022-04-13 NOTE — PATIENT INSTRUCTIONS
"                                                          Sinusitis   If your condition worsens or fails to improve we recommend that you receive another evaluation at the ER immediately or contact your PCP to discuss your concerns or return here. You must understand that you've received an urgent care treatment only and that you may be released before all your medical problems are known or treated. You the patient will arrange for followup care as instructed.   If we discussed that I think your illness is viral it will not respond to antibiotics and it will last 10-14 days. However, if over the next few days the symptoms worsen start the antibiotics I have given you.   -  Flonase (fluticasone) is a nasal spray which is available over the counter and may help with your symptoms   -  If you have hypertension avoid using the "D" which is the decongestant. Instead, you can use Coricidin HBP for your cold and cough symptoms.      -  If you just have a congested feeling you can take pseudoephedrine (unless you have high blood pressure) which you have to sign for behind the counter. Do not buy the phenylephrine which is on the shelf as it is not effective   -  Rest and fluids are also important.   -  Tylenol or ibuprofen can also be used as directed for pain unless you have an allergy to them or medical condition such as stomach ulcers, kidney or liver disease or blood thinners etc for which you should not be taking these type of medications.   -  If you are flying in the next few days Afrin nose drops for the airplane flight upon take off and landing may help. Other than at those times refrain from using afrin.    Take tessalon as prescribed.  "

## 2022-04-13 NOTE — PROGRESS NOTES
Subjective:       Patient ID: Roger Putnam is a 22 y.o. male.    Vitals:  height is 6' (1.829 m) and weight is 72.6 kg (160 lb). His temperature is 97.8 °F (36.6 °C). His blood pressure is 122/77 and his pulse is 76. His respiration is 18 and oxygen saturation is 97%.     Chief Complaint: Cough    Pt stated that he has been sick since Monday . Pt stated that he took a home covid test and it was negative . Pt stated that he does not want to be tested for the flu or anything else . Pts pharmacy has been updated .     Cough  This is a new problem. The current episode started in the past 7 days. The problem has been unchanged. The problem occurs every few minutes. The cough is non-productive. Associated symptoms include a fever, headaches, myalgias, nasal congestion, rhinorrhea and a sore throat. Pertinent negatives include no chest pain, chills, ear congestion, ear pain, heartburn, hemoptysis, postnasal drip, rash, shortness of breath, sweats, weight loss or wheezing. Nothing aggravates the symptoms. He has tried OTC cough suppressant for the symptoms. The treatment provided no relief.       Constitution: Positive for fever. Negative for activity change, appetite change, chills, sweating, fatigue, unexpected weight change and generalized weakness.   HENT: Positive for sinus pain, sinus pressure and sore throat. Negative for ear pain, ear discharge, facial swelling, congestion, postnasal drip and trouble swallowing.    Neck: Negative for neck pain, neck stiffness and neck swelling.   Cardiovascular: Negative for chest pain, leg swelling, palpitations, sob on exertion and passing out.   Respiratory: Positive for cough. Negative for sleep apnea, chest tightness, sputum production, bloody sputum, COPD, shortness of breath, stridor, wheezing and asthma.    Gastrointestinal: Negative.  Negative for abdominal pain, nausea, vomiting, constipation, diarrhea and heartburn.   Endocrine: hair loss.   Musculoskeletal: Positive for  muscle ache. Negative for pain and trauma.        Reports bodyaches last night.   Skin: Negative for rash and hives.   Allergic/Immunologic: Negative for asthma and hives.   Neurological: Positive for headaches. Negative for dizziness, light-headedness, loss of balance, disorientation, altered mental status, numbness, tingling and seizures.   Hematologic/Lymphatic: Negative for history of blood clots and history of bleeding disorder.   Psychiatric/Behavioral: Negative for altered mental status, disorientation and confusion.       Objective:      Physical Exam   Constitutional: He is oriented to person, place, and time. He appears well-developed. He is cooperative.  Non-toxic appearance. He does not appear ill. No distress.   HENT:   Head: Normocephalic and atraumatic.   Ears:   Right Ear: Hearing, tympanic membrane, external ear and ear canal normal.   Left Ear: Hearing, tympanic membrane, external ear and ear canal normal.   Nose: Mucosal edema present. No rhinorrhea or nasal deformity. No epistaxis. Right sinus exhibits maxillary sinus tenderness. Right sinus exhibits no frontal sinus tenderness. Left sinus exhibits maxillary sinus tenderness. Left sinus exhibits no frontal sinus tenderness.   Mouth/Throat: Uvula is midline, oropharynx is clear and moist and mucous membranes are normal. No trismus in the jaw. Normal dentition. No uvula swelling. No oropharyngeal exudate, posterior oropharyngeal edema or posterior oropharyngeal erythema.   Eyes: Conjunctivae and lids are normal. No scleral icterus.   Neck: Trachea normal and phonation normal. Neck supple. No edema present. No erythema present. No neck rigidity present.   Cardiovascular: Normal rate, regular rhythm, normal heart sounds and normal pulses.   Pulmonary/Chest: Effort normal and breath sounds normal. No respiratory distress. He has no decreased breath sounds. He has no rhonchi.   Abdominal: Normal appearance.   Musculoskeletal: Normal range of motion.          General: No deformity. Normal range of motion.   Neurological: He is alert and oriented to person, place, and time. He exhibits normal muscle tone. Coordination normal.   Skin: Skin is warm, dry, intact, not diaphoretic and not pale.   Psychiatric: His speech is normal and behavior is normal. Judgment and thought content normal.   Nursing note and vitals reviewed.  Discussed with PT sinusitis likely caused by virus. Discussed with PT  if condition worsens or fails to improve that PT receive another evaluation at the ER immediately or contact your PCP to discuss your concerns or return here.Reviewed to take Flonase as directed by medication label directions. Take tessalon as prescribed Also discussed was rest and fluids as well as Tylenol or ibuprofen can also be used as directed for pain or fever. PT verbalized understanding and agreed with plan and diagnosis.  Results for orders placed or performed in visit on 04/13/22   POCT Influenza A/B MOLECULAR   Result Value Ref Range    POC Molecular Influenza A Ag Negative Negative, Not Reported    POC Molecular Influenza B Ag Negative Negative, Not Reported     Acceptable Yes          Assessment:       1. Cough    2. Sinusitis, unspecified chronicity, unspecified location          Plan:         Cough  -     POCT Influenza A/B MOLECULAR    Sinusitis, unspecified chronicity, unspecified location              Additional MDM:     Heart Failure Score:   COPD = No

## 2022-05-25 ENCOUNTER — OFFICE VISIT (OUTPATIENT)
Dept: NEUROLOGY | Facility: CLINIC | Age: 22
End: 2022-05-25
Payer: COMMERCIAL

## 2022-05-25 DIAGNOSIS — G40.309 NONINTRACTABLE GENERALIZED IDIOPATHIC EPILEPSY WITHOUT STATUS EPILEPTICUS: Primary | ICD-10-CM

## 2022-05-25 DIAGNOSIS — G40.909 SEIZURE DISORDER: ICD-10-CM

## 2022-05-25 PROCEDURE — 99214 OFFICE O/P EST MOD 30 MIN: CPT | Mod: 95,,, | Performed by: PSYCHIATRY & NEUROLOGY

## 2022-05-25 PROCEDURE — 1160F RVW MEDS BY RX/DR IN RCRD: CPT | Mod: CPTII,95,, | Performed by: PSYCHIATRY & NEUROLOGY

## 2022-05-25 PROCEDURE — 99214 PR OFFICE/OUTPT VISIT, EST, LEVL IV, 30-39 MIN: ICD-10-PCS | Mod: 95,,, | Performed by: PSYCHIATRY & NEUROLOGY

## 2022-05-25 PROCEDURE — 1159F MED LIST DOCD IN RCRD: CPT | Mod: CPTII,95,, | Performed by: PSYCHIATRY & NEUROLOGY

## 2022-05-25 PROCEDURE — 1159F PR MEDICATION LIST DOCUMENTED IN MEDICAL RECORD: ICD-10-PCS | Mod: CPTII,95,, | Performed by: PSYCHIATRY & NEUROLOGY

## 2022-05-25 PROCEDURE — 1160F PR REVIEW ALL MEDS BY PRESCRIBER/CLIN PHARMACIST DOCUMENTED: ICD-10-PCS | Mod: CPTII,95,, | Performed by: PSYCHIATRY & NEUROLOGY

## 2022-05-25 RX ORDER — LAMOTRIGINE 100 MG/1
100 TABLET ORAL 2 TIMES DAILY
Qty: 180 TABLET | Refills: 3 | Status: SHIPPED | OUTPATIENT
Start: 2022-05-25 | End: 2023-05-01 | Stop reason: SDUPTHER

## 2022-05-25 RX ORDER — LISDEXAMFETAMINE DIMESYLATE CAPSULES 20 MG/1
20 CAPSULE ORAL EVERY MORNING
COMMUNITY
End: 2023-02-01 | Stop reason: SDUPTHER

## 2022-05-25 NOTE — PROGRESS NOTES
Name: Roger Putnam  MRN: [id number]   CSN: [id number]      Date: 05/25/2022    The patient location is: home  The chief complaint leading to consultation is: seizures  Visit type: audiovisual  Face to Face time with patient: 30 minutes of total time spent on the encounter, which includes face to face time and non-face to face time preparing to see the patient (eg, review of tests), Obtaining and/or reviewing separately obtained history, Documenting clinical information in the electronic or other health record, Independently interpreting results (not separately reported) and communicating results to the patient/family/caregiver, or Care coordination (not separately reported).   Each patient to whom he or she provides medical services by telemedicine is:  (1) informed of the relationship between the physician and patient and the respective role of any other health care provider with respect to management of the patient; and (2) notified that he or she may decline to receive medical services by telemedicine and may withdraw from such care at any time.    HISTORY OF PRESENT ILLNESS (HPI)  The patient is a 22 y.o. man presents for follow up for epilepsy.      Initial seizure was generalized convulsion in 2013, reports all seizures have been generalized convulsions.  He was started on medication at that time with several changes as below.  Routine EEG 2016 normal and routine EEG 2019 with generalized SW.  He was started on LTG January 2019 and has had no further seizures since starting this medication.  He is currently driving.    Interim History  Doing well with no further seizures, started vyvanse which has been helpful with academic work, currently enrolled in pre-pharmacy program    ED visits  Episodes of SE  Change in meds    Seizure Seminology  Seizure Type 1  Classification:   Aura -   Ictus  - Nonconv -  - Conv -  - Duration -   Post-ictal  - Symptoms  - Duration  Age of onset   Current Seizure Frequency -    Last  Seizure      sz per month 2019 2020 2021 2022 2023 2024   Jacques         Feb         Mar         Apr         May         Alhaji         Jul         Aug         Sep         Oct         Nov         Dec         Tot           Seizure Triggers  Sleep Deprivation -  None  Other medications -  None   Benadral   Tramadol   Other  Psych/stress -  None  Photic stimulation -  None  Hyperventilation -  None  Medical Problems -  None  Menses -   No  Sensory Stimulation    Light  No   Sound  No   Problem Solv No   Other  No  Missed dose of Rx None    AED Treatments  Present regimen  lamotrigine (Lamictal, LTG) 200mg/d (start 1/2019)    Prior treatments  carbamazepine (Tegretol, CBZ) 2013, ataxia  levetiracetam (Keppra, LEV) 2013, rage  phenytoin (Dilantin, PHT) 6060-6780, failed wean  zonisamide (Zonegran, ZNA)  2013, memory and balance    Not tried  acetazolamide (Diamox, AZM)  Amantadine  brivitiracetam (Briviact, BRV)  carisbamate (Xcorpi, XCP)  clobazam (Onfi or Frizium, CLB)  ethosuximide (Zarontin, ESM)  eslicarbazine (Aptiom, ESL)  felbamate (felbatol, FBM)  gabapentin (Neurontin, GPN)  lacosamide (Vimpat, LCS)   methsuximide (Celontin, MSM)  oxcarbazepine (Trileptal OXC)  perampanel (Fycompa, FCP)   phenobarbital (Pb)  pregabalin (Lyrica, PGB)  primidone (Mysoline, PRM)  rufinamide (Banzel, RUF)  tiagabine (Gabatril,  TGB)  topiramate (Topamax, TPM)  viagabatrin, (Sabril, VGB)  vagal nerve stimulator (VNS)  valproic acid (Depakote, VPA)    Benzodiazepines  diazepam - rectal (Diastatl)  diazepam - oral (Valium, DZ)  clonazepam (Klonopin, CZP)  clorazepate (Tranxene, CLZ)  Ativan  Brain Stimulation  Vagal Nerve Stimulation-n/a  DBS- n/a    Adherence/Compliance method  Memory - yes  Mom or Spouse - Yes  Pill Box - no  [place] calendar - no  Turn over medication bottle - no  Phone alarm - no    Seizure Evaluation  EEG Routine -   EEG Ambulatory -   EEG\Video Monitoring -   MRI/MRA -   CT/CTA Scan -   PET Scan -   Neuropsychological  evaluation -   DEXA Scan    Potential Epilepsy Risk Factors:   Pregnancy/Labor/Delivery - full term uncomplicated pregnancy labor and vaginal delivery  Febrile seizures - none  Head injury  - none  CNS infection - none     Stroke - none  Family Hx of Sz - none    PAST MEDICAL HISTORY:   Active Ambulatory Problems     Diagnosis Date Noted    Seizure 09/13/2013    Anxiety 09/13/2013    Epilepsy 06/09/2014    Social isolation 06/09/2014    Abnormal EEG 01/25/2019    Attention deficit hyperactivity disorder (ADHD) 12/07/2020     Resolved Ambulatory Problems     Diagnosis Date Noted    No Resolved Ambulatory Problems     Past Medical History:   Diagnosis Date    ADHD (attention deficit hyperactivity disorder)     Panic attack     Seizures     Social anxiety disorder         PAST SURGICAL HISTORY: No past surgical history on file.     FAMILY HISTORY:   Family History   Problem Relation Age of Onset    Alcohol abuse Maternal Grandmother     Arthritis Mother     Miscarriages / Stillbirths Mother     Asthma Brother     Cancer Father     COPD Paternal Grandmother     Drug abuse Paternal Uncle          SOCIAL HISTORY:   Social History     Socioeconomic History    Marital status: Single   Tobacco Use    Smoking status: Never Smoker    Smokeless tobacco: Never Used   Substance and Sexual Activity    Alcohol use: No     Alcohol/week: 0.0 standard drinks    Drug use: No    Sexual activity: Never     Social Determinants of Health     Financial Resource Strain: Low Risk     Difficulty of Paying Living Expenses: Not hard at all   Food Insecurity: No Food Insecurity    Worried About Running Out of Food in the Last Year: Never true    Ran Out of Food in the Last Year: Never true   Transportation Needs: No Transportation Needs    Lack of Transportation (Medical): No    Lack of Transportation (Non-Medical): No   Physical Activity: Sufficiently Active    Days of Exercise per Week: 5 days    Minutes of  Exercise per Session: 90 min   Social Connections: Unknown    Frequency of Communication with Friends and Family: Twice a week    Frequency of Social Gatherings with Friends and Family: Once a week    Active Member of Clubs or Organizations: Yes    Attends Club or Organization Meetings: More than 4 times per year    Marital Status: Never    Housing Stability: Low Risk     Unable to Pay for Housing in the Last Year: No    Number of Places Lived in the Last Year: 1    Unstable Housing in the Last Year: No        SUBSTANCE USE:  Social History     Tobacco Use    Smoking status: Never Smoker    Smokeless tobacco: Never Used   Substance and Sexual Activity    Alcohol use: No     Alcohol/week: 0.0 standard drinks    Drug use: No    Sexual activity: Never      Social History     Tobacco Use    Smoking status: Never Smoker    Smokeless tobacco: Never Used   Substance Use Topics    Alcohol use: No     Alcohol/week: 0.0 standard drinks        ALLERGIES: Patient has no known allergies.     There were no vitals taken for this visit.    Higher Cortical Function:    Patient is a well developed, pleasant, well groomed individual appearing their stated age  Oriented - intact to person, place and time and followed two step instruction correctly.    Fund of knowledge was appropriate.    R-L Orientation - Intact   Language - Speech was fluent without evidence for an aphasia.  Cranial Nerves II - XII:    EOMs were intact with normal smooth and no nystagmus.    PERRLA. D/C Visual fields were full to confrontation.    Motor - facial movement was symmetrical and normal.    Facial sensory - Light touch sensations were normal.    Hearing was normal to finger rub.  Palate moved well and was symmetrical with normal palatal and oral sensation.    Tongue movement was full   Normal power and bulk was found in the massiter and rotator muscles of the neck.  Motor: Power, bulk and tone were normal in all  extremities.  Sensory: Light touch senses were normal in all extremities.    Coordination:       Rapid alternating movements and rapid finger tapping - normal.    Gait:  Station, gait were done without difficulty and Romberg was negative.  Tremor: resting, postural, intentional - none     IMPRESSION  1. IGE        DISPOSITION:    1. Continue LTG 200mg/d    Follow up 12 months     2014 EPILEPSY QUALITY MEASUREMENT (AAN)  1 a. Seizure Frequency: noted  1b. Seizure Intervention: yes  2.                   a. Etiology: Genetic  b. Seizure Type: GTC  c. Epilepsy Syndrome: IGE  3.                   a. Side-effects of AED: no                        b. Intervention for side-effects: n/a  4. Screening for psychiatric or behavioral disorders: yes  5. Personalized epilepsy safety issues and education: yes  6. Counseling for women of child-bearing potential and epilepsy: n/a  7. Referral of treatment-resistant epilepsy to comprehensive epilepsy center (q 2 years): N/A.     The patient was asked to call me/the clinic 1 week after the test(s) are done to obtain results.  The following issues were  all discussed in detail with the patient and family/caregiver(s):     1. Diagnosis, plans, prognosis, medications and possible side-effects, risks and benefits of treatment, other alternatives to AEDs.  2. Risks related to continued seizures, status epilepticus, SUDEP, driving restrictions and seizure precautions (no baths but showers are ok, no swimming unsupervised, no use of heavy machinery, no use of sharp moving objects, avoid heights).   3. Issues related to pregnancy, OCP and breast feeding as it relates to epilepsy.  4. The potential of teratogenicity and suicidal risks of anti-epileptic medications.  5.Avoid any activity that compromise patient safety related to seizures.      Questions and concerns raised by the patient and family/care-giver(s) were addressed and they indicated understanding of everything discussed and agreed  to plans as above.

## 2023-02-01 ENCOUNTER — OFFICE VISIT (OUTPATIENT)
Dept: PRIMARY CARE CLINIC | Facility: CLINIC | Age: 23
End: 2023-02-01
Payer: COMMERCIAL

## 2023-02-01 VITALS
WEIGHT: 162.5 LBS | DIASTOLIC BLOOD PRESSURE: 80 MMHG | BODY MASS INDEX: 22.01 KG/M2 | HEIGHT: 72 IN | HEART RATE: 70 BPM | SYSTOLIC BLOOD PRESSURE: 138 MMHG

## 2023-02-01 DIAGNOSIS — Z00.00 ANNUAL PHYSICAL EXAM: Primary | ICD-10-CM

## 2023-02-01 DIAGNOSIS — G40.309 NONINTRACTABLE GENERALIZED IDIOPATHIC EPILEPSY WITHOUT STATUS EPILEPTICUS: ICD-10-CM

## 2023-02-01 DIAGNOSIS — F41.9 ANXIETY: ICD-10-CM

## 2023-02-01 DIAGNOSIS — F90.9 ATTENTION DEFICIT HYPERACTIVITY DISORDER (ADHD), UNSPECIFIED ADHD TYPE: ICD-10-CM

## 2023-02-01 PROCEDURE — 3079F DIAST BP 80-89 MM HG: CPT | Mod: CPTII,S$GLB,, | Performed by: INTERNAL MEDICINE

## 2023-02-01 PROCEDURE — 3075F PR MOST RECENT SYSTOLIC BLOOD PRESS GE 130-139MM HG: ICD-10-PCS | Mod: CPTII,S$GLB,, | Performed by: INTERNAL MEDICINE

## 2023-02-01 PROCEDURE — 99395 PREV VISIT EST AGE 18-39: CPT | Mod: S$GLB,,, | Performed by: INTERNAL MEDICINE

## 2023-02-01 PROCEDURE — 99395 PR PREVENTIVE VISIT,EST,18-39: ICD-10-PCS | Mod: S$GLB,,, | Performed by: INTERNAL MEDICINE

## 2023-02-01 PROCEDURE — 3008F BODY MASS INDEX DOCD: CPT | Mod: CPTII,S$GLB,, | Performed by: INTERNAL MEDICINE

## 2023-02-01 PROCEDURE — 99999 PR PBB SHADOW E&M-EST. PATIENT-LVL III: ICD-10-PCS | Mod: PBBFAC,,, | Performed by: INTERNAL MEDICINE

## 2023-02-01 PROCEDURE — 1159F MED LIST DOCD IN RCRD: CPT | Mod: CPTII,S$GLB,, | Performed by: INTERNAL MEDICINE

## 2023-02-01 PROCEDURE — 1159F PR MEDICATION LIST DOCUMENTED IN MEDICAL RECORD: ICD-10-PCS | Mod: CPTII,S$GLB,, | Performed by: INTERNAL MEDICINE

## 2023-02-01 PROCEDURE — 99999 PR PBB SHADOW E&M-EST. PATIENT-LVL III: CPT | Mod: PBBFAC,,, | Performed by: INTERNAL MEDICINE

## 2023-02-01 PROCEDURE — 3008F PR BODY MASS INDEX (BMI) DOCUMENTED: ICD-10-PCS | Mod: CPTII,S$GLB,, | Performed by: INTERNAL MEDICINE

## 2023-02-01 PROCEDURE — 3079F PR MOST RECENT DIASTOLIC BLOOD PRESSURE 80-89 MM HG: ICD-10-PCS | Mod: CPTII,S$GLB,, | Performed by: INTERNAL MEDICINE

## 2023-02-01 PROCEDURE — 3075F SYST BP GE 130 - 139MM HG: CPT | Mod: CPTII,S$GLB,, | Performed by: INTERNAL MEDICINE

## 2023-02-01 RX ORDER — LISDEXAMFETAMINE DIMESYLATE CAPSULES 20 MG/1
20 CAPSULE ORAL EVERY MORNING
Qty: 30 CAPSULE | Refills: 0 | Status: SHIPPED | OUTPATIENT
Start: 2023-02-01 | End: 2023-05-01

## 2023-02-01 NOTE — PROGRESS NOTES
[facility] Note    Chief Complaint      Chief Complaint   Patient presents with    Annual Exam       History of Present Illness      Roger Putnam is a 22 y.o. male with chronic conditions of ADHD, anxiety, epilepsy who presents today for: annual preventative visit.    Epilepsy: Previously saw Dr. Mack, pediatric neurology.  Sees DR. Santoro.  On lamotrigine.  No seizure since starting lamictal 1/17/2019.    Depression, anxiety, ADHD: Previously seen Dr. Chao, psychiatrist, Dr. Reyes, psychology.  On cymbalta, folic acid and xanax as needed.  Doing well on this regimen.    Diet: Prepares own food mostly.  Limiting fatty foods and carbs.  Drinks plenty water.    Exercise: runs 3 miles 2x/week.  Swims 2x/week.  Denies drinking and driving, drinking more than 4 drinks on occasion, drug use.     Flu shot UTD.  Td 2019. COVID vaccine UTD.    Past Medical History:  Past Medical History:   Diagnosis Date    ADHD (attention deficit hyperactivity disorder)     Panic attack     Seizures     Social anxiety disorder        Past Surgical History:   has no past surgical history on file.    Family History:  family history includes Alcohol abuse in his maternal grandmother; Arthritis in his mother; Asthma in his brother; COPD in his paternal grandmother; Cancer in his father; Drug abuse in his paternal uncle; Miscarriages / Stillbirths in his mother.     Social History:  Social History     Tobacco Use    Smoking status: Never    Smokeless tobacco: Never   Substance Use Topics    Alcohol use: No    Drug use: No       I personally reviewed all past medical, surgical, social and family history.    Review of Systems   Constitutional:  Negative for chills, fever and malaise/fatigue.   Respiratory:  Negative for shortness of breath.    Cardiovascular:  Negative for chest pain.   Gastrointestinal:  Negative for constipation, diarrhea, nausea and vomiting.   Skin:  Negative for rash.   Neurological:  Negative for  weakness.   All other systems reviewed and are negative.     Medications:  Outpatient Encounter Medications as of 2/1/2023   Medication Sig Dispense Refill    ALPRAZolam (XANAX) 0.25 MG tablet Take 0.25 mg by mouth 3 (three) times daily. Take only when needed for Anxiety      DULoxetine (CYMBALTA) 30 MG capsule TAKE ONE CAPSULE BY MOUTH EVERY DAY 90 capsule 3    fish oil-omega-3 fatty acids 300-1,000 mg capsule Take by mouth once daily.      fluticasone (FLONASE) 50 mcg/actuation nasal spray       lamoTRIgine (LAMICTAL) 100 MG tablet Take 1 tablet (100 mg total) by mouth 2 (two) times daily. Stop if a rash occurs 180 tablet 3    levomefolate calcium (ELFOLATE) 15 mg Tab TAKE ONE TABLET BY MOUTH EVERY DAY 90 tablet 2    lisdexamfetamine (VYVANSE) 20 MG capsule Take 1 capsule (20 mg total) by mouth every morning. 30 capsule 0    [DISCONTINUED] lisdexamfetamine (VYVANSE) 20 MG capsule Take 20 mg by mouth every morning.       No facility-administered encounter medications on file as of 2/1/2023.       Allergies:  Review of patient's allergies indicates:  No Known Allergies    Health Maintenance:  Immunization History   Administered Date(s) Administered    COVID-19, MRNA, LN-S, PF (Pfizer) (Purple Cap) 03/27/2021, 04/17/2021, 12/18/2021    DTaP (5 Pertussis Antigens) 1998, 1998, 1998, 11/22/1999, 07/30/2002    HIB 1998, 1998, 1998, 11/22/1999    Hepatitis A, Pediatric/Adolescent, 2 Dose 06/24/2016    Hepatitis B 1998, 1998, 02/18/1999    IPV 1998, 1998, 07/30/2002    Influenza - Quadrivalent - PF *Preferred* (6 months and older) 12/12/2019, 09/07/2020, 10/22/2022    MMR 04/26/1999, 07/30/2002    Meningococcal Conjugate (MCV4P) 07/20/2009, 06/24/2016    OPV 04/26/1999    PPD Test 02/18/1999    Td - PF (ADULT) 12/12/2019    Tdap 07/20/2009    Varicella 04/26/1999, 07/20/2009      Health Maintenance   Topic Date Due    Hepatitis C Screening  Never done    HPV  Vaccines (1 - Male 2-dose series) Never done    TETANUS VACCINE  12/12/2029    Lipid Panel  Completed        Physical Exam      Vital Signs  Pulse: 70  BP: 138/80  BP Location: Right arm  Patient Position: Sitting  Pain Score: 0-No pain  Height and Weight  Height: 6' (182.9 cm)  Weight: 73.7 kg (162 lb 7.7 oz)  BSA (Calculated - sq m): 1.93 sq meters  BMI (Calculated): 22  Weight in (lb) to have BMI = 25: 183.9]    Physical Exam  Vitals reviewed.   Constitutional:       Appearance: He is well-developed.   HENT:      Head: Normocephalic and atraumatic.      Right Ear: External ear normal.      Left Ear: External ear normal.   Cardiovascular:      Rate and Rhythm: Normal rate and regular rhythm.      Heart sounds: Normal heart sounds. No murmur heard.  Pulmonary:      Effort: Pulmonary effort is normal.      Breath sounds: Normal breath sounds. No wheezing or rales.   Abdominal:      General: Bowel sounds are normal.      Palpations: Abdomen is soft.        Laboratory:  CBC:  Recent Labs   Lab 01/11/22  0811   WBC 5.77   RBC 5.47   Hemoglobin 17.6   Hematocrit 51.1   Platelets 237   MCV 93   MCH 32.2 H   MCHC 34.4     CMP:  Recent Labs   Lab 01/11/22  0811   Glucose 95   Calcium 10.0   Albumin 4.4   Total Protein 7.3   Sodium 140   Potassium 4.7   CO2 29   Chloride 102   BUN 13   Alkaline Phosphatase 60   ALT 12   AST 21   Total Bilirubin 0.9     URINALYSIS:       LIPIDS:  Recent Labs   Lab 01/11/22  0811   HDL 49   Cholesterol 155   Triglycerides 76   LDL Cholesterol 90.8   HDL/Cholesterol Ratio 31.6   Non-HDL Cholesterol 106   Total Cholesterol/HDL Ratio 3.2     TSH:      A1C:        Assessment/Plan     Roger Putnam is a 22 y.o.male with:    1. Annual physical exam  Discussed diet and exercise, vaccines and cancer screening, risk factors.  Screening labs ordered.     2. Attention deficit hyperactivity disorder (ADHD), unspecified ADHD type  - lisdexamfetamine (VYVANSE) 20 MG capsule; Take 1 capsule (20 mg total) by  mouth every morning.  Dispense: 30 capsule; Refill: 0  Continue current meds.    3. Anxiety  Continue current meds.    4. Nonintractable generalized idiopathic epilepsy without status epilepticus  Continue current meds.      Chronic conditions status updated as per HPI.  Other than changes above, cont current medications and maintain follow up with specialists.  No follow-ups on file.    No future appointments.    Devin Frias MD  [facility]

## 2023-05-01 ENCOUNTER — OFFICE VISIT (OUTPATIENT)
Dept: NEUROLOGY | Facility: CLINIC | Age: 23
End: 2023-05-01
Payer: COMMERCIAL

## 2023-05-01 VITALS
DIASTOLIC BLOOD PRESSURE: 82 MMHG | HEART RATE: 69 BPM | BODY MASS INDEX: 22.09 KG/M2 | HEIGHT: 72 IN | SYSTOLIC BLOOD PRESSURE: 137 MMHG | WEIGHT: 163.13 LBS

## 2023-05-01 DIAGNOSIS — G40.909 SEIZURE DISORDER: ICD-10-CM

## 2023-05-01 PROCEDURE — 3075F PR MOST RECENT SYSTOLIC BLOOD PRESS GE 130-139MM HG: ICD-10-PCS | Mod: CPTII,S$GLB,, | Performed by: PSYCHIATRY & NEUROLOGY

## 2023-05-01 PROCEDURE — 99214 OFFICE O/P EST MOD 30 MIN: CPT | Mod: S$GLB,,, | Performed by: PSYCHIATRY & NEUROLOGY

## 2023-05-01 PROCEDURE — 1159F MED LIST DOCD IN RCRD: CPT | Mod: CPTII,S$GLB,, | Performed by: PSYCHIATRY & NEUROLOGY

## 2023-05-01 PROCEDURE — 1160F PR REVIEW ALL MEDS BY PRESCRIBER/CLIN PHARMACIST DOCUMENTED: ICD-10-PCS | Mod: CPTII,S$GLB,, | Performed by: PSYCHIATRY & NEUROLOGY

## 2023-05-01 PROCEDURE — 99999 PR PBB SHADOW E&M-EST. PATIENT-LVL III: ICD-10-PCS | Mod: PBBFAC,,, | Performed by: PSYCHIATRY & NEUROLOGY

## 2023-05-01 PROCEDURE — 99999 PR PBB SHADOW E&M-EST. PATIENT-LVL III: CPT | Mod: PBBFAC,,, | Performed by: PSYCHIATRY & NEUROLOGY

## 2023-05-01 PROCEDURE — 3075F SYST BP GE 130 - 139MM HG: CPT | Mod: CPTII,S$GLB,, | Performed by: PSYCHIATRY & NEUROLOGY

## 2023-05-01 PROCEDURE — 99214 PR OFFICE/OUTPT VISIT, EST, LEVL IV, 30-39 MIN: ICD-10-PCS | Mod: S$GLB,,, | Performed by: PSYCHIATRY & NEUROLOGY

## 2023-05-01 PROCEDURE — 3008F BODY MASS INDEX DOCD: CPT | Mod: CPTII,S$GLB,, | Performed by: PSYCHIATRY & NEUROLOGY

## 2023-05-01 PROCEDURE — 3079F DIAST BP 80-89 MM HG: CPT | Mod: CPTII,S$GLB,, | Performed by: PSYCHIATRY & NEUROLOGY

## 2023-05-01 PROCEDURE — 1160F RVW MEDS BY RX/DR IN RCRD: CPT | Mod: CPTII,S$GLB,, | Performed by: PSYCHIATRY & NEUROLOGY

## 2023-05-01 PROCEDURE — 3008F PR BODY MASS INDEX (BMI) DOCUMENTED: ICD-10-PCS | Mod: CPTII,S$GLB,, | Performed by: PSYCHIATRY & NEUROLOGY

## 2023-05-01 PROCEDURE — 3079F PR MOST RECENT DIASTOLIC BLOOD PRESSURE 80-89 MM HG: ICD-10-PCS | Mod: CPTII,S$GLB,, | Performed by: PSYCHIATRY & NEUROLOGY

## 2023-05-01 PROCEDURE — 1159F PR MEDICATION LIST DOCUMENTED IN MEDICAL RECORD: ICD-10-PCS | Mod: CPTII,S$GLB,, | Performed by: PSYCHIATRY & NEUROLOGY

## 2023-05-01 RX ORDER — LAMOTRIGINE 100 MG/1
100 TABLET ORAL 2 TIMES DAILY
Qty: 180 TABLET | Refills: 3 | Status: SHIPPED | OUTPATIENT
Start: 2023-05-01 | End: 2023-06-26

## 2023-05-01 NOTE — PROGRESS NOTES
Name: Roger Putnam  MRN: [id number]   CSN: [id number]      Date: 05/01/2023    HISTORY OF PRESENT ILLNESS (HPI)  The patient is a 23 y.o. man presents for follow up for epilepsy.      Initial seizure was generalized convulsion in 2013, reports all seizures have been generalized convulsions.  He was started on medication at that time with several changes as below.  Routine EEG 2016 normal and routine EEG 2019 with generalized SW.  He was started on LTG January 2019 and has had no further seizures since starting this medication.  He is currently driving.    Interim History    Doing well with no further seizures, no longer taking vyvanse, continues pre-pharmacy program    5/2022  Doing well with no further seizures, started vyvanse which has been helpful with academic work, currently enrolled in pre-pharmacy program    ED visits  Episodes of SE  Change in meds    Seizure Seminology  Seizure Type 1  Classification:   Aura -   Ictus  - Nonconv -  - Conv -  - Duration -   Post-ictal  - Symptoms  - Duration  Age of onset   Current Seizure Frequency -    Last Seizure      sz per month 2019 2020 2021 2022 2023 2024   Jacques         Feb         Mar         Apr         May         Alhaji         Jul         Aug         Sep         Oct         Nov         Dec         Tot           Seizure Triggers  Sleep Deprivation -  None  Other medications -  None   Benadral   Tramadol   Other  Psych/stress -  None  Photic stimulation -  None  Hyperventilation -  None  Medical Problems -  None  Menses -   No  Sensory Stimulation    Light  No   Sound  No   Problem Solv No   Other  No  Missed dose of Rx None    AED Treatments  Present regimen  lamotrigine (Lamictal, LTG) 200mg/d (start 1/2019)    Prior treatments  carbamazepine (Tegretol, CBZ) 2013, ataxia  levetiracetam (Keppra, LEV) 2013, rage  phenytoin (Dilantin, PHT) 8514-2705, failed wean  zonisamide (Zonegran, ZNA)  2013, memory and balance    Not tried  acetazolamide (Diamox,  AZM)  Amantadine  brivitiracetam (Briviact, BRV)  carisbamate (Xcorpi, XCP)  clobazam (Onfi or Frizium, CLB)  ethosuximide (Zarontin, ESM)  eslicarbazine (Aptiom, ESL)  felbamate (felbatol, FBM)  gabapentin (Neurontin, GPN)  lacosamide (Vimpat, LCS)   methsuximide (Celontin, MSM)  oxcarbazepine (Trileptal OXC)  perampanel (Fycompa, FCP)   phenobarbital (Pb)  pregabalin (Lyrica, PGB)  primidone (Mysoline, PRM)  rufinamide (Banzel, RUF)  tiagabine (Gabatril,  TGB)  topiramate (Topamax, TPM)  viagabatrin, (Sabril, VGB)  vagal nerve stimulator (VNS)  valproic acid (Depakote, VPA)    Benzodiazepines  diazepam - rectal (Diastatl)  diazepam - oral (Valium, DZ)  clonazepam (Klonopin, CZP)  clorazepate (Tranxene, CLZ)  Ativan  Brain Stimulation  Vagal Nerve Stimulation-n/a  DBS- n/a    Adherence/Compliance method  Memory - yes  Mom or Spouse - Yes  Pill Box - no  [place] calendar - no  Turn over medication bottle - no  Phone alarm - no    Seizure Evaluation  EEG Routine -   EEG Ambulatory -   EEG\Video Monitoring -   MRI/MRA -   CT/CTA Scan -   PET Scan -   Neuropsychological evaluation -   DEXA Scan    Potential Epilepsy Risk Factors:   Pregnancy/Labor/Delivery - full term uncomplicated pregnancy labor and vaginal delivery  Febrile seizures - none  Head injury  - none  CNS infection - none     Stroke - none  Family Hx of Sz - none    PAST MEDICAL HISTORY:   Active Ambulatory Problems     Diagnosis Date Noted    Seizure 09/13/2013    Anxiety 09/13/2013    Epilepsy 06/09/2014    Social isolation 06/09/2014    Abnormal EEG 01/25/2019    Attention deficit hyperactivity disorder (ADHD) 12/07/2020     Resolved Ambulatory Problems     Diagnosis Date Noted    No Resolved Ambulatory Problems     Past Medical History:   Diagnosis Date    ADHD (attention deficit hyperactivity disorder)     Panic attack     Seizures     Social anxiety disorder         PAST SURGICAL HISTORY: No past surgical history on file.     FAMILY HISTORY:   Family  History   Problem Relation Age of Onset    Alcohol abuse Maternal Grandmother     Arthritis Mother     Miscarriages / Stillbirths Mother     Asthma Brother     Cancer Father     COPD Paternal Grandmother     Drug abuse Paternal Uncle          SOCIAL HISTORY:   Social History     Socioeconomic History    Marital status: Single   Tobacco Use    Smoking status: Never    Smokeless tobacco: Never   Substance and Sexual Activity    Alcohol use: No    Drug use: No    Sexual activity: Not Currently        SUBSTANCE USE:  Social History     Tobacco Use    Smoking status: Never    Smokeless tobacco: Never   Substance and Sexual Activity    Alcohol use: No    Drug use: No    Sexual activity: Not Currently      Social History     Tobacco Use    Smoking status: Never    Smokeless tobacco: Never   Substance Use Topics    Alcohol use: No        ALLERGIES: Patient has no known allergies.     /82   Pulse 69   Ht 6' (1.829 m)   Wt 74 kg (163 lb 2.3 oz)   BMI 22.13 kg/m²     Higher Cortical Function:    Patient is a well developed, pleasant, well groomed individual appearing their stated age  Oriented - intact to person, place and time and followed two step instruction correctly.    Fund of knowledge was appropriate.    R-L Orientation - Intact   Language - Speech was fluent without evidence for an aphasia.  Cranial Nerves II - XII:    EOMs were intact with normal smooth and no nystagmus.    PERRLA. D/C Visual fields were full to confrontation.    Motor - facial movement was symmetrical and normal.    Facial sensory - Light touch sensations were normal.    Hearing was normal to finger rub.  Palate moved well and was symmetrical with normal palatal and oral sensation.    Tongue movement was full   Normal power and bulk was found in the massiter and rotator muscles of the neck.  Motor: Power, bulk and tone were normal in all extremities.  Sensory: Light touch senses were normal in all extremities.    Coordination:       Rapid  alternating movements and rapid finger tapping - normal.    Gait:  Station, gait were done without difficulty and Romberg was negative.  Tremor: resting, postural, intentional - none     IMPRESSION  1. IGE       DISPOSITION:   1. Continue LTG 200mg/d    Follow up 12 months    2014 EPILEPSY QUALITY MEASUREMENT (AAN)  1 a. Seizure Frequency: noted  1b. Seizure Intervention: yes  2.                   a. Etiology: Genetic  b. Seizure Type: GTC  c. Epilepsy Syndrome: IGE  3.                   a. Side-effects of AED: no                        b. Intervention for side-effects: n/a  4. Screening for psychiatric or behavioral disorders: yes  5. Personalized epilepsy safety issues and education: yes  6. Counseling for women of child-bearing potential and epilepsy: n/a  7. Referral of treatment-resistant epilepsy to comprehensive epilepsy center (q 2 years): N/A.     The patient was asked to call me/the clinic 1 week after the test(s) are done to obtain results.  The following issues were  all discussed in detail with the patient and family/caregiver(s):     1. Diagnosis, plans, prognosis, medications and possible side-effects, risks and benefits of treatment, other alternatives to AEDs.  2. Risks related to continued seizures, status epilepticus, SUDEP, driving restrictions and seizure precautions (no baths but showers are ok, no swimming unsupervised, no use of heavy machinery, no use of sharp moving objects, avoid heights).   3. Issues related to pregnancy, OCP and breast feeding as it relates to epilepsy.  4. The potential of teratogenicity and suicidal risks of anti-epileptic medications.  5.Avoid any activity that compromise patient safety related to seizures.      Questions and concerns raised by the patient and family/care-giver(s) were addressed and they indicated understanding of everything discussed and agreed to plans as above.

## 2023-06-25 DIAGNOSIS — G40.909 SEIZURE DISORDER: ICD-10-CM

## 2023-06-25 DIAGNOSIS — F41.9 ANXIETY: ICD-10-CM

## 2023-06-25 RX ORDER — LEVOMEFOLATE CALCIUM 15 MG
TABLET ORAL
Qty: 90 TABLET | Refills: 2 | Status: SHIPPED | OUTPATIENT
Start: 2023-06-25 | End: 2024-03-08 | Stop reason: SDUPTHER

## 2023-06-26 RX ORDER — LAMOTRIGINE 100 MG/1
TABLET ORAL
Qty: 180 TABLET | Refills: 3 | Status: SHIPPED | OUTPATIENT
Start: 2023-06-26

## 2023-06-26 NOTE — TELEPHONE ENCOUNTER
lamoTRIgine (LAMICTAL) 100 MG tablet     Sig: TAKE ONE TABLET BY MOUTH TWICE A DAY, STOP IF RASH OCCURS    Disp:  180 tablet    Refills:  3    Start: 6/25/2023    Class: Normal    For: Seizure disorder    Last ordered: 1 month ago by Andrey Santoro MD Last refill: 4/3/2023    Rx #: [id number]    Anticonvulsants Protocol Passed 06/25/2023 11:57 AM   Protocol Details  Visit with Authorizing provider in past 9 months or upcoming 90 days      To be filled at: [facility] - [place] [address]

## 2023-10-02 ENCOUNTER — ON-DEMAND VIRTUAL (OUTPATIENT)
Dept: URGENT CARE | Facility: CLINIC | Age: 23
End: 2023-10-02
Payer: COMMERCIAL

## 2023-10-02 DIAGNOSIS — S80.812A CAT SCRATCH OF LEFT LOWER LEG, INITIAL ENCOUNTER: Primary | ICD-10-CM
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
## 2023-10-02 PROCEDURE — 99213 OFFICE O/P EST LOW 20 MIN: CPT | Mod: 95,,, | Performed by: PHYSICIAN ASSISTANT

## 2023-10-02 PROCEDURE — 99213 PR OFFICE/OUTPT VISIT, EST, LEVL III, 20-29 MIN: ICD-10-PCS | Mod: 95,,, | Performed by: PHYSICIAN ASSISTANT

## 2023-10-02 RX ORDER — AMOXICILLIN AND CLAVULANATE POTASSIUM 875; 125 MG/1; MG/1
1 TABLET, FILM COATED ORAL 2 TIMES DAILY
Qty: 20 TABLET | Refills: 0 | Status: SHIPPED | OUTPATIENT
Start: 2023-10-02 | End: 2023-10-12

## 2023-10-02 NOTE — PROGRESS NOTES
Subjective:      Patient ID: Roger Putnam is a 23 y.o. male.    Vitals:  vitals were not taken for this visit.     Chief Complaint: Cellulitis      Visit Type: TELE AUDIOVISUAL    Present with the patient at the time of consultation: TELEMED PRESENT WITH PATIENT: None    Past Medical History:   Diagnosis Date    ADHD (attention deficit hyperactivity disorder)     Panic attack     Seizures     Social anxiety disorder      History reviewed. No pertinent surgical history.  Review of patient's allergies indicates:  No Known Allergies  Current Outpatient Medications on File Prior to Visit   Medication Sig Dispense Refill    ALPRAZolam (XANAX) 0.25 MG tablet Take 0.25 mg by mouth 3 (three) times daily. Take only when needed for Anxiety      DULoxetine (CYMBALTA) 30 MG capsule TAKE ONE CAPSULE BY MOUTH EVERY DAY 90 capsule 3    fish oil-omega-3 fatty acids 300-1,000 mg capsule Take by mouth once daily.      fluticasone (FLONASE) 50 mcg/actuation nasal spray       lamoTRIgine (LAMICTAL) 100 MG tablet TAKE ONE TABLET BY MOUTH TWICE A DAY, STOP IF RASH OCCURS 180 tablet 3    levomefolate calcium (ELFOLATE) 15 mg Tab TAKE ONE TABLET BY MOUTH EVERY DAY 90 tablet 2     No current facility-administered medications on file prior to visit.     Family History   Problem Relation Age of Onset    Alcohol abuse Maternal Grandmother     Arthritis Mother     Miscarriages / Stillbirths Mother     Asthma Brother     Cancer Father     COPD Paternal Grandmother     Drug abuse Paternal Uncle            [facility] Intake    10/2/2023  1:43 PM CDT - Filed by Patient   Describe your reason for todays visit Infected cat scratch   What is your current physical address in the event of a medical emergency? [address]   Are you able to take your vital signs? Yes   Systolic Blood Pressure: 123   Diastolic Blood Pressure: 79   Weight: 160   Height: 72   Pulse: 74   Temperature: 98.2   Respiration rate:    Pulse Oxygen:    Please attach any  relevant images or files          Patient was scratched by his cat 3 days ago. He denies fever. He has red scratch on leg and larger area around the scratch that is warm to the touch and tender. He denies drainage from the wound. He reports that his cat is up to date on vaccines.         Constitution: Negative for chills, sweating, fatigue and fever.   Skin:  Positive for color change and abrasion. Negative for erythema.        Objective:   The physical exam was conducted virtually.  Physical Exam   Constitutional:      Comments:10 cm linear abrasion on out lower leg with surrounding warmth and erythema     Skin: No erythema       Assessment:     No diagnosis found.    He has cellulitis has being scratched by his cat last week. He denies fever. I will start him on augmentin. He was told to go to the clinic if area of redness continues to increase after starting antibiotics.     Plan:       There are no diagnoses linked to this encounter.

## 2024-02-20 DIAGNOSIS — F41.9 ANXIETY: ICD-10-CM

## 2024-02-20 RX ORDER — DULOXETIN HYDROCHLORIDE 30 MG/1
CAPSULE, DELAYED RELEASE ORAL
Qty: 90 CAPSULE | Refills: 0 | Status: SHIPPED | OUTPATIENT
Start: 2024-02-20 | End: 2024-03-08 | Stop reason: SDUPTHER

## 2024-02-20 NOTE — TELEPHONE ENCOUNTER
Refill Routing Note   Medication(s) are not appropriate for processing by [facility] for the following reason(s):        Required labs outdated    [facility] action(s):  Defer               Appointments  past 12m or future 3m with PCP    Date Provider   Last Visit   2/1/2023 Devin Frias MD   Next Visit   5/8/2024 Devin Frias MD   ED visits in past 90 days: 0        Note composed:9:21 AM 02/20/2024

## 2024-02-20 NOTE — TELEPHONE ENCOUNTER
No care due was identified.  Health [facility] Embedded Care Due Messages. Reference number: [id number].   2/20/2024 8:50:15 AM CST

## 2024-03-07 ENCOUNTER — PATIENT MESSAGE (OUTPATIENT)
Dept: PRIMARY CARE CLINIC | Facility: CLINIC | Age: 24
End: 2024-03-07
Payer: COMMERCIAL

## 2024-03-08 ENCOUNTER — OFFICE VISIT (OUTPATIENT)
Dept: PRIMARY CARE CLINIC | Facility: CLINIC | Age: 24
End: 2024-03-08
Payer: COMMERCIAL

## 2024-03-08 VITALS
BODY MASS INDEX: 22.27 KG/M2 | HEIGHT: 72 IN | DIASTOLIC BLOOD PRESSURE: 78 MMHG | WEIGHT: 164.44 LBS | SYSTOLIC BLOOD PRESSURE: 120 MMHG | HEART RATE: 66 BPM | OXYGEN SATURATION: 99 %

## 2024-03-08 DIAGNOSIS — F41.9 ANXIETY: ICD-10-CM

## 2024-03-08 DIAGNOSIS — F90.9 ATTENTION DEFICIT HYPERACTIVITY DISORDER (ADHD), UNSPECIFIED ADHD TYPE: ICD-10-CM

## 2024-03-08 DIAGNOSIS — Z00.00 ANNUAL PHYSICAL EXAM: Primary | ICD-10-CM

## 2024-03-08 DIAGNOSIS — G40.309 NONINTRACTABLE GENERALIZED IDIOPATHIC EPILEPSY WITHOUT STATUS EPILEPTICUS: ICD-10-CM

## 2024-03-08 PROCEDURE — 99395 PREV VISIT EST AGE 18-39: CPT | Mod: S$GLB,,, | Performed by: INTERNAL MEDICINE

## 2024-03-08 PROCEDURE — 1159F MED LIST DOCD IN RCRD: CPT | Mod: CPTII,S$GLB,, | Performed by: INTERNAL MEDICINE

## 2024-03-08 PROCEDURE — 3078F DIAST BP <80 MM HG: CPT | Mod: CPTII,S$GLB,, | Performed by: INTERNAL MEDICINE

## 2024-03-08 PROCEDURE — 3008F BODY MASS INDEX DOCD: CPT | Mod: CPTII,S$GLB,, | Performed by: INTERNAL MEDICINE

## 2024-03-08 PROCEDURE — 3074F SYST BP LT 130 MM HG: CPT | Mod: CPTII,S$GLB,, | Performed by: INTERNAL MEDICINE

## 2024-03-08 PROCEDURE — 99999 PR PBB SHADOW E&M-EST. PATIENT-LVL III: CPT | Mod: PBBFAC,,, | Performed by: INTERNAL MEDICINE

## 2024-03-08 RX ORDER — LEVOMEFOLATE CALCIUM 15 MG
1 TABLET ORAL DAILY
Qty: 90 TABLET | Refills: 3 | Status: SHIPPED | OUTPATIENT
Start: 2024-03-08

## 2024-03-08 RX ORDER — DULOXETIN HYDROCHLORIDE 30 MG/1
30 CAPSULE, DELAYED RELEASE ORAL DAILY
Qty: 90 CAPSULE | Refills: 3 | Status: SHIPPED | OUTPATIENT
Start: 2024-03-08 | End: 2024-05-21

## 2024-03-08 NOTE — PROGRESS NOTES
[facility] Note    Chief Complaint      Chief Complaint   Patient presents with    Annual Exam       History of Present Illness      Roger Putnam is a 23 y.o. male with chronic conditions of ADHD, anxiety, epilepsy who presents today for:  Epilepsy: Previously saw Dr. Mack, pediatric neurology.  Sees DR. Santoro.  On lamotrigine.  No seizure since starting lamictal 1/17/2019.    Depression, anxiety, ADHD: Previously seen Dr. Chao, psychiatrist, Dr. Reyes, psychology.  On cymbalta, folic acid and xanax as needed.  Doing well on this regimen.    Diet: Prepares own food mostly.  Limiting fatty foods and carbs.  Drinks plenty water.    Exercise: runs 3 miles 2x/week.   Denies drinking and driving, drinking more than 4 drinks on occasion, drug use.     Flu shot due.  Td 2019. COVID vaccine UTD.  Cscope and PSA due age 43.     Past Medical History:  Past Medical History:   Diagnosis Date    ADHD (attention deficit hyperactivity disorder)     Panic attack     Seizures     Social anxiety disorder        Past Surgical History:   has no past surgical history on file.    Family History:  family history includes Alcohol abuse in his maternal grandmother; Arthritis in his mother; Asthma in his brother; COPD in his paternal grandmother; Cancer in his father; Drug abuse in his paternal uncle; Miscarriages / Stillbirths in his mother.     Social History:  Social History     Tobacco Use    Smoking status: Never    Smokeless tobacco: Never   Substance Use Topics    Alcohol use: No    Drug use: No       I personally reviewed all past medical, surgical, social and family history.    Review of Systems   Constitutional:  Negative for chills, fever and malaise/fatigue.   Respiratory:  Negative for shortness of breath.    Cardiovascular:  Negative for chest pain.   Gastrointestinal:  Negative for constipation, diarrhea, nausea and vomiting.   Skin:  Negative for rash.   Neurological:  Negative for weakness.   All  other systems reviewed and are negative.       Medications:  Outpatient Encounter Medications as of 3/8/2024   Medication Sig Dispense Refill    ALPRAZolam (XANAX) 0.25 MG tablet Take 0.25 mg by mouth 3 (three) times daily. Take only when needed for Anxiety      DULoxetine (CYMBALTA) 30 MG capsule Take 1 capsule (30 mg total) by mouth once daily. 90 capsule 3    fish oil-omega-3 fatty acids 300-1,000 mg capsule Take by mouth once daily.      fluticasone (FLONASE) 50 mcg/actuation nasal spray       lamoTRIgine (LAMICTAL) 100 MG tablet TAKE ONE TABLET BY MOUTH TWICE A DAY, STOP IF RASH OCCURS 180 tablet 3    levomefolate calcium (ELFOLATE) 15 mg Tab Take 15 mg by mouth once daily. 90 tablet 3    [DISCONTINUED] DULoxetine (CYMBALTA) 30 MG capsule TAKE ONE CAPSULE BY MOUTH EVERY DAY 90 capsule 0    [DISCONTINUED] levomefolate calcium (ELFOLATE) 15 mg Tab TAKE ONE TABLET BY MOUTH EVERY DAY 90 tablet 2     No facility-administered encounter medications on file as of 3/8/2024.       Allergies:  Review of patient's allergies indicates:  No Known Allergies    Health Maintenance:  Immunization History   Administered Date(s) Administered    COVID-19, MRNA, LN-S, PF (Pfizer) (Purple Cap) 03/27/2021, 04/17/2021, 12/18/2021    DTaP (5 Pertussis Antigens) 1998, 1998, 1998, 11/22/1999, 07/30/2002    HIB 1998, 1998, 1998, 11/22/1999    Hepatitis A, Pediatric/Adolescent, 2 Dose 06/24/2016    Hepatitis B 1998, 1998, 02/18/1999    IPV 1998, 1998, 07/30/2002    Influenza - Quadrivalent - PF *Preferred* (6 months and older) 12/12/2019, 09/07/2020, 10/22/2022    MMR 04/26/1999, 07/30/2002    Meningococcal Conjugate (MCV4P) 07/20/2009, 06/24/2016    OPV 04/26/1999    PPD Test 02/18/1999    Td - PF (ADULT) 12/12/2019    Tdap 07/20/2009    Varicella 04/26/1999, 07/20/2009      Health Maintenance   Topic Date Due    Hepatitis C Screening  Never done    HPV Vaccines (1 - Male 2-dose  series) Never done    TETANUS VACCINE  12/12/2029    Lipid Panel  Completed        Physical Exam      Vital Signs  Pulse: 66  SpO2: 99 %  BP: 120/78  BP Location: Right arm  Patient Position: Sitting  Pain Score: 0-No pain  Height and Weight  Height: 6' (182.9 cm)  Weight: 74.6 kg (164 lb 7.4 oz)  BSA (Calculated - sq m): 1.95 sq meters  BMI (Calculated): 22.3  Weight in (lb) to have BMI = 25: 183.9]    Physical Exam  Vitals reviewed.   Constitutional:       Appearance: He is well-developed.   HENT:      Head: Normocephalic and atraumatic.      Right Ear: External ear normal.      Left Ear: External ear normal.   Cardiovascular:      Rate and Rhythm: Normal rate and regular rhythm.      Heart sounds: Normal heart sounds. No murmur heard.  Pulmonary:      Effort: Pulmonary effort is normal.      Breath sounds: Normal breath sounds. No wheezing or rales.   Abdominal:      General: Bowel sounds are normal.      Palpations: Abdomen is soft.          Laboratory:  CBC:  Recent Labs   Lab 01/11/22  0811   WBC 5.77   RBC 5.47   Hemoglobin 17.6   Hematocrit 51.1   Platelets 237   MCV 93   MCH 32.2 H   MCHC 34.4     CMP:  Recent Labs   Lab 01/11/22  0811   Glucose 95   Calcium 10.0   Albumin 4.4   Total Protein 7.3   Sodium 140   Potassium 4.7   CO2 29   Chloride 102   BUN 13   Alkaline Phosphatase 60   ALT 12   AST 21   Total Bilirubin 0.9     URINALYSIS:       LIPIDS:  Recent Labs   Lab 01/11/22  0811   HDL 49   Cholesterol 155   Triglycerides 76   LDL Cholesterol 90.8   HDL/Cholesterol Ratio 31.6   Non-HDL Cholesterol 106   Total Cholesterol/HDL Ratio 3.2     TSH:      A1C:        Assessment/Plan     Roger Putnam is a 23 y.o.male with:    1. Annual physical exam  - CBC Auto Differential; Future  - Comprehensive Metabolic Panel; Future  - Lipid Panel; Future  - TSH; Future  - LAMOTRIGINE LEVEL; Future  Discussed diet and exercise, vaccines and cancer screening, risk factors.  Screening labs ordered.     2. Anxiety  -  DULoxetine (CYMBALTA) 30 MG capsule; Take 1 capsule (30 mg total) by mouth once daily.  Dispense: 90 capsule; Refill: 3  - levomefolate calcium (ELFOLATE) 15 mg Tab; Take 15 mg by mouth once daily.  Dispense: 90 tablet; Refill: 3  Continue current meds.    3. Attention deficit hyperactivity disorder (ADHD), unspecified ADHD type  STable, not on stimulants  4. Nonintractable generalized idiopathic epilepsy without status epilepticus  - LAMOTRIGINE LEVEL; Future  Continue current meds.  F/U with neurology.     Chronic conditions status updated as per HPI.  Other than changes above, cont current medications and maintain follow up with specialists.  No follow-ups on file.    No future appointments.    Devin Frias MD  [facility]

## 2024-03-14 ENCOUNTER — LAB VISIT (OUTPATIENT)
Dept: LAB | Facility: HOSPITAL | Age: 24
End: 2024-03-14
Attending: INTERNAL MEDICINE
Payer: COMMERCIAL

## 2024-03-14 DIAGNOSIS — G40.309 NONINTRACTABLE GENERALIZED IDIOPATHIC EPILEPSY WITHOUT STATUS EPILEPTICUS: ICD-10-CM

## 2024-03-14 DIAGNOSIS — Z00.00 ANNUAL PHYSICAL EXAM: ICD-10-CM

## 2024-03-14 LAB
ALBUMIN SERPL BCP-MCNC: 4.4 G/DL (ref 3.5–5.2)
ALP SERPL-CCNC: 53 U/L (ref 55–135)
ALT SERPL W/O P-5'-P-CCNC: 15 U/L (ref 10–44)
ANION GAP SERPL CALC-SCNC: 10 MMOL/L (ref 8–16)
AST SERPL-CCNC: 22 U/L (ref 10–40)
BASOPHILS # BLD AUTO: 0.02 K/UL (ref 0–0.2)
BASOPHILS NFR BLD: 0.5 % (ref 0–1.9)
BILIRUB SERPL-MCNC: 0.8 MG/DL (ref 0.1–1)
BUN SERPL-MCNC: 13 MG/DL (ref 6–20)
CALCIUM SERPL-MCNC: 9.9 MG/DL (ref 8.7–10.5)
CHLORIDE SERPL-SCNC: 106 MMOL/L (ref 95–110)
CHOLEST SERPL-MCNC: 151 MG/DL (ref 120–199)
CHOLEST/HDLC SERPL: 3.5 {RATIO} (ref 2–5)
CO2 SERPL-SCNC: 25 MMOL/L (ref 23–29)
CREAT SERPL-MCNC: 1.1 MG/DL (ref 0.5–1.4)
DIFFERENTIAL METHOD BLD: ABNORMAL
EOSINOPHIL # BLD AUTO: 0 K/UL (ref 0–0.5)
EOSINOPHIL NFR BLD: 0.7 % (ref 0–8)
ERYTHROCYTE [DISTWIDTH] IN BLOOD BY AUTOMATED COUNT: 12.4 % (ref 11.5–14.5)
EST. GFR  (NO RACE VARIABLE): >60 ML/MIN/1.73 M^2
GLUCOSE SERPL-MCNC: 87 MG/DL (ref 70–110)
HCT VFR BLD AUTO: 48.7 % (ref 40–54)
HDLC SERPL-MCNC: 43 MG/DL (ref 40–75)
HDLC SERPL: 28.5 % (ref 20–50)
HGB BLD-MCNC: 16.2 G/DL (ref 14–18)
IMM GRANULOCYTES # BLD AUTO: 0 K/UL (ref 0–0.04)
IMM GRANULOCYTES NFR BLD AUTO: 0 % (ref 0–0.5)
LDLC SERPL CALC-MCNC: 96 MG/DL (ref 63–159)
LYMPHOCYTES # BLD AUTO: 1.7 K/UL (ref 1–4.8)
LYMPHOCYTES NFR BLD: 40 % (ref 18–48)
MCH RBC QN AUTO: 31.2 PG (ref 27–31)
MCHC RBC AUTO-ENTMCNC: 33.3 G/DL (ref 32–36)
MCV RBC AUTO: 94 FL (ref 82–98)
MONOCYTES # BLD AUTO: 0.4 K/UL (ref 0.3–1)
MONOCYTES NFR BLD: 9.5 % (ref 4–15)
NEUTROPHILS # BLD AUTO: 2.1 K/UL (ref 1.8–7.7)
NEUTROPHILS NFR BLD: 49.3 % (ref 38–73)
NONHDLC SERPL-MCNC: 108 MG/DL
NRBC BLD-RTO: 0 /100 WBC
PLATELET # BLD AUTO: 217 K/UL (ref 150–450)
PMV BLD AUTO: 11.9 FL (ref 9.2–12.9)
POTASSIUM SERPL-SCNC: 4.4 MMOL/L (ref 3.5–5.1)
PROT SERPL-MCNC: 7.2 G/DL (ref 6–8.4)
RBC # BLD AUTO: 5.2 M/UL (ref 4.6–6.2)
SODIUM SERPL-SCNC: 141 MMOL/L (ref 136–145)
TRIGL SERPL-MCNC: 60 MG/DL (ref 30–150)
TSH SERPL DL<=0.005 MIU/L-ACNC: 1.03 UIU/ML (ref 0.4–4)
WBC # BLD AUTO: 4.2 K/UL (ref 3.9–12.7)

## 2024-03-14 PROCEDURE — 36415 COLL VENOUS BLD VENIPUNCTURE: CPT | Performed by: INTERNAL MEDICINE

## 2024-03-14 PROCEDURE — 84443 ASSAY THYROID STIM HORMONE: CPT | Performed by: INTERNAL MEDICINE

## 2024-03-14 PROCEDURE — 80175 DRUG SCREEN QUAN LAMOTRIGINE: CPT | Performed by: INTERNAL MEDICINE

## 2024-03-14 PROCEDURE — 80053 COMPREHEN METABOLIC PANEL: CPT | Performed by: INTERNAL MEDICINE

## 2024-03-14 PROCEDURE — 80061 LIPID PANEL: CPT | Performed by: INTERNAL MEDICINE

## 2024-03-14 PROCEDURE — 85025 COMPLETE CBC W/AUTO DIFF WBC: CPT | Performed by: INTERNAL MEDICINE

## 2024-03-18 LAB — LAMOTRIGINE SERPL-MCNC: 4.1 UG/ML (ref 2–15)

## 2024-05-20 DIAGNOSIS — F41.9 ANXIETY: ICD-10-CM

## 2024-05-20 NOTE — TELEPHONE ENCOUNTER
No care due was identified.  [facility] Embedded Care Due Messages. Reference number: [id number].   5/20/2024 9:13:32 AM CDT

## 2024-05-21 RX ORDER — DULOXETIN HYDROCHLORIDE 30 MG/1
30 CAPSULE, DELAYED RELEASE ORAL
Qty: 90 CAPSULE | Refills: 3 | Status: SHIPPED | OUTPATIENT
Start: 2024-05-21

## 2024-05-22 NOTE — TELEPHONE ENCOUNTER
Refill Decision Note   Roger Putnam  is requesting a refill authorization.  Brief Assessment and Rationale for Refill:  Approve     Medication Therapy Plan:         Comments:     Note composed:10:41 PM 05/21/2024

## 2024-07-14 DIAGNOSIS — G40.909 SEIZURE DISORDER: ICD-10-CM

## 2024-07-15 RX ORDER — LAMOTRIGINE 100 MG/1
200 TABLET ORAL DAILY
Qty: 180 TABLET | Refills: 0 | Status: SHIPPED | OUTPATIENT
Start: 2024-07-15

## 2024-10-10 ENCOUNTER — OFFICE VISIT (OUTPATIENT)
Dept: NEUROLOGY | Facility: CLINIC | Age: 24
End: 2024-10-10
Payer: MEDICAID

## 2024-10-10 DIAGNOSIS — G40.309 NONINTRACTABLE GENERALIZED IDIOPATHIC EPILEPSY WITHOUT STATUS EPILEPTICUS: Primary | ICD-10-CM

## 2024-10-10 PROCEDURE — 99214 OFFICE O/P EST MOD 30 MIN: CPT | Mod: 95,,, | Performed by: PSYCHIATRY & NEUROLOGY

## 2024-10-10 PROCEDURE — 1160F RVW MEDS BY RX/DR IN RCRD: CPT | Mod: CPTII,95,, | Performed by: PSYCHIATRY & NEUROLOGY

## 2024-10-10 PROCEDURE — 1159F MED LIST DOCD IN RCRD: CPT | Mod: CPTII,95,, | Performed by: PSYCHIATRY & NEUROLOGY

## 2024-10-10 RX ORDER — LAMOTRIGINE 150 MG/1
150 TABLET ORAL 2 TIMES DAILY
Qty: 180 TABLET | Refills: 3 | Status: SHIPPED | OUTPATIENT
Start: 2024-10-10 | End: 2025-10-10

## 2024-10-10 NOTE — PROGRESS NOTES
Name: Roger Putnam  MRN: [id number]   CSN: [id number]      Date: 10/10/2024    The patient location is: home  The chief complaint leading to consultation is: epilepsy  Visit type: audiovisual  Face to Face time with patient: 35 minutes of total time spent on the encounter, which includes face to face time and non-face to face time preparing to see the patient (eg, review of tests), Obtaining and/or reviewing separately obtained history, Documenting clinical information in the electronic or other health record, Independently interpreting results (not separately reported) and communicating results to the patient/family/caregiver, or Care coordination (not separately reported).   Each patient to whom he or she provides medical services by telemedicine is:  (1) informed of the relationship between the physician and patient and the respective role of any other health care provider with respect to management of the patient; and (2) notified that he or she may decline to receive medical services by telemedicine and may withdraw from such care at any time.    HISTORY OF PRESENT ILLNESS (HPI)  The patient is a 24 y.o. man presents for follow up for epilepsy.      Initial seizure was generalized convulsion in 2013, reports all seizures have been generalized convulsions.  He was started on medication at that time with several changes as below.  Routine EEG 2016 normal and routine EEG 2019 with generalized SW.  He was started on LTG January 2019 and has had no further seizures since starting this medication.  He is currently driving.    Interim History    Doing well with four instances of internal sensation of pending seizure which he distinguishes from panic attack in the past year, continues of vyvanse and on cymbalta and otherwise dong well    5/2023  Doing well with no further seizures, no longer taking vyvanse, continues pre-pharmacy program    5/2022  Doing well with no further seizures, started vyvanse which has been  helpful with academic work, currently enrolled in pre-pharmacy program    ED visits  Episodes of SE  Change in meds    Seizure Seminology  Seizure Type 1  Classification:   Aura -   Ictus  - Nonconv -  - Conv -  - Duration -   Post-ictal  - Symptoms  - Duration  Age of onset   Current Seizure Frequency -    Last Seizure      sz per month 2019 2020 2021 2022 2023 2024   Jacques         Feb         Mar         Apr         May         Alhaji         Jul         Aug         Sep         Oct         Nov         Dec         Tot           Seizure Triggers  Sleep Deprivation -  None  Other medications -  None   Benadral   Tramadol   Other  Psych/stress -  None  Photic stimulation -  None  Hyperventilation -  None  Medical Problems -  None  Menses -   No  Sensory Stimulation    Light  No   Sound  No   Problem Solv No   Other  No  Missed dose of Rx None    AED Treatments  Present regimen  lamotrigine (Lamictal, LTG) 200mg/d (start 1/2019)    Prior treatments  carbamazepine (Tegretol, CBZ) 2013, ataxia  levetiracetam (Keppra, LEV) 2013, rage  phenytoin (Dilantin, PHT) 3799-0920, failed wean  zonisamide (Zonegran, ZNA)  2013, memory and balance    Not tried  acetazolamide (Diamox, AZM)  Amantadine  brivitiracetam (Briviact, BRV)  carisbamate (Xcorpi, XCP)  clobazam (Onfi or Frizium, CLB)  ethosuximide (Zarontin, ESM)  eslicarbazine (Aptiom, ESL)  felbamate (felbatol, FBM)  gabapentin (Neurontin, GPN)  lacosamide (Vimpat, LCS)   methsuximide (Celontin, MSM)  oxcarbazepine (Trileptal OXC)  perampanel (Fycompa, FCP)   phenobarbital (Pb)  pregabalin (Lyrica, PGB)  primidone (Mysoline, PRM)  rufinamide (Banzel, RUF)  tiagabine (Gabatril,  TGB)  topiramate (Topamax, TPM)  viagabatrin, (Sabril, VGB)  vagal nerve stimulator (VNS)  valproic acid (Depakote, VPA)    Benzodiazepines  diazepam - rectal (Diastatl)  diazepam - oral (Valium, DZ)  clonazepam (Klonopin, CZP)  clorazepate (Tranxene, CLZ)  Ativan  Brain Stimulation  Vagal Nerve  Stimulation-n/a  DBS- n/a    Adherence/Compliance method  Memory - yes  Mom or Spouse - Yes  Pill Box - no  [place] calendar - no  Turn over medication bottle - no  Phone alarm - no    Seizure Evaluation  EEG Routine -   EEG Ambulatory -   EEG\Video Monitoring -   MRI/MRA -   CT/CTA Scan -   PET Scan -   Neuropsychological evaluation -   DEXA Scan    Potential Epilepsy Risk Factors:   Pregnancy/Labor/Delivery - full term uncomplicated pregnancy labor and vaginal delivery  Febrile seizures - none  Head injury  - none  CNS infection - none     Stroke - none  Family Hx of Sz - none    PAST MEDICAL HISTORY:   Active Ambulatory Problems     Diagnosis Date Noted    Seizure 09/13/2013    Anxiety 09/13/2013    Epilepsy 06/09/2014    Social isolation 06/09/2014    Abnormal EEG 01/25/2019    Attention deficit hyperactivity disorder (ADHD) 12/07/2020     Resolved Ambulatory Problems     Diagnosis Date Noted    No Resolved Ambulatory Problems     Past Medical History:   Diagnosis Date    ADHD (attention deficit hyperactivity disorder)     Panic attack     Seizures     Social anxiety disorder         PAST SURGICAL HISTORY: No past surgical history on file.     FAMILY HISTORY:   Family History   Problem Relation Name Age of Onset    Alcohol abuse Maternal Grandmother Sheila     Arthritis Mother Citlali     Miscarriages / Stillbirths Mother Citlali     Asthma Brother Len     Cancer Father Jelani     COPD Paternal Grandmother Sheridan     Drug abuse Paternal Uncle Ry          SOCIAL HISTORY:   Social History     Socioeconomic History    Marital status: Single   Tobacco Use    Smoking status: Never    Smokeless tobacco: Never   Substance and Sexual Activity    Alcohol use: No    Drug use: No    Sexual activity: Not Currently     Social Drivers of Health     Financial Resource Strain: Low Risk  (12/19/2021)    Overall Financial Resource Strain (CARDIA)     Difficulty of Paying Living Expenses: Not hard at all   Food Insecurity: No  Food Insecurity (12/19/2021)    Hunger Vital Sign     Worried About Running Out of Food in the Last Year: Never true     Ran Out of Food in the Last Year: Never true   Transportation Needs: No Transportation Needs (12/19/2021)    PRAPARE - Transportation     Lack of Transportation (Medical): No     Lack of Transportation (Non-Medical): No   Physical Activity: Sufficiently Active (12/19/2021)    Exercise Vital Sign     Days of Exercise per Week: 5 days     Minutes of Exercise per Session: 90 min   Stress: No Stress Concern Present (12/11/2019)    [facility] - Occupational Stress Questionnaire     Feeling of Stress : Only a little   Housing Stability: Low Risk  (12/19/2021)    Housing Stability Vital Sign     Unable to Pay for Housing in the Last Year: No     Number of Places Lived in the Last Year: 1     Unstable Housing in the Last Year: No        SUBSTANCE USE:  Social History     Tobacco Use    Smoking status: Never    Smokeless tobacco: Never   Substance and Sexual Activity    Alcohol use: No    Drug use: No    Sexual activity: Not Currently      Social History     Tobacco Use    Smoking status: Never    Smokeless tobacco: Never   Substance Use Topics    Alcohol use: No        ALLERGIES: Patient has no known allergies.     There were no vitals taken for this visit.    Higher Cortical Function:    Patient is a well developed, pleasant, well groomed individual appearing their stated age  Oriented - intact to person, place and time and followed two step instruction correctly.    Fund of knowledge was appropriate.    R-L Orientation - Intact   Language - Speech was fluent without evidence for an aphasia.  Cranial Nerves II - XII:    EOMs were intact with normal smooth and no nystagmus.    PERRLA. D/C Visual fields were full to confrontation.    Motor - facial movement was symmetrical and normal.    Facial sensory - Light touch sensations were normal.    Hearing was normal to finger  rub.  Palate moved well and was symmetrical with normal palatal and oral sensation.    Tongue movement was full   Normal power and bulk was found in the massiter and rotator muscles of the neck.  Motor: Power, bulk and tone were normal in all extremities.  Sensory: Light touch senses were normal in all extremities.    Coordination:       Rapid alternating movements and rapid finger tapping - normal.    Gait:  Station, gait were done without difficulty and Romberg was negative.  Tremor: resting, postural, intentional - none     IMPRESSION  1. IGE       DISPOSITION:   1. Increase ->150mg bid    Follow up 12 months    2014 EPILEPSY QUALITY MEASUREMENT (AAN)  1 a. Seizure Frequency: noted  1b. Seizure Intervention: yes  2.                   a. Etiology: Genetic  b. Seizure Type: GTC  c. Epilepsy Syndrome: IGE  3.                   a. Side-effects of AED: no                        b. Intervention for side-effects: n/a  4. Screening for psychiatric or behavioral disorders: yes  5. Personalized epilepsy safety issues and education: yes  6. Counseling for women of child-bearing potential and epilepsy: n/a  7. Referral of treatment-resistant epilepsy to comprehensive epilepsy center (q 2 years): N/A.     The patient was asked to call me/the clinic 1 week after the test(s) are done to obtain results.  The following issues were  all discussed in detail with the patient and family/caregiver(s):     1. Diagnosis, plans, prognosis, medications and possible side-effects, risks and benefits of treatment, other alternatives to AEDs.  2. Risks related to continued seizures, status epilepticus, SUDEP, driving restrictions and seizure precautions (no baths but showers are ok, no swimming unsupervised, no use of heavy machinery, no use of sharp moving objects, avoid heights).   3. Issues related to pregnancy, OCP and breast feeding as it relates to epilepsy.  4. The potential of teratogenicity and suicidal risks of anti-epileptic  medications.  5.Avoid any activity that compromise patient safety related to seizures.      Questions and concerns raised by the patient and family/care-giver(s) were addressed and they indicated understanding of everything discussed and agreed to plans as above.

## 2025-01-10 ENCOUNTER — TELEPHONE (OUTPATIENT)
Dept: PRIMARY CARE CLINIC | Facility: CLINIC | Age: 25
End: 2025-01-10
Payer: MEDICAID

## 2025-01-10 ENCOUNTER — PATIENT MESSAGE (OUTPATIENT)
Dept: PRIMARY CARE CLINIC | Facility: CLINIC | Age: 25
End: 2025-01-10
Payer: MEDICAID

## 2025-01-10 NOTE — TELEPHONE ENCOUNTER
----- Message from Luis Armando sent at 1/10/2025 12:30 PM CST -----  Contact: [phone]  No blue slot available to schedule an appointment for the patient.  Patient is established with which PCP: Altagracia  Reason for the visit: shots for school   Would the patient like a call back, or a response through their [facility] portal?:  call back

## 2025-01-20 ENCOUNTER — PATIENT MESSAGE (OUTPATIENT)
Dept: PRIMARY CARE CLINIC | Facility: CLINIC | Age: 25
End: 2025-01-20
Payer: MEDICAID

## 2025-02-10 ENCOUNTER — PATIENT MESSAGE (OUTPATIENT)
Dept: PRIMARY CARE CLINIC | Facility: CLINIC | Age: 25
End: 2025-02-10
Payer: MEDICAID

## 2025-02-13 ENCOUNTER — OFFICE VISIT (OUTPATIENT)
Dept: PRIMARY CARE CLINIC | Facility: CLINIC | Age: 25
End: 2025-02-13
Payer: MEDICAID

## 2025-02-13 VITALS
DIASTOLIC BLOOD PRESSURE: 80 MMHG | HEART RATE: 81 BPM | OXYGEN SATURATION: 98 % | SYSTOLIC BLOOD PRESSURE: 130 MMHG | HEIGHT: 72 IN | BODY MASS INDEX: 22.48 KG/M2 | WEIGHT: 166 LBS

## 2025-02-13 DIAGNOSIS — F90.9 ATTENTION DEFICIT HYPERACTIVITY DISORDER (ADHD), UNSPECIFIED ADHD TYPE: ICD-10-CM

## 2025-02-13 DIAGNOSIS — G40.309 NONINTRACTABLE GENERALIZED IDIOPATHIC EPILEPSY WITHOUT STATUS EPILEPTICUS: ICD-10-CM

## 2025-02-13 DIAGNOSIS — F41.9 ANXIETY: Primary | ICD-10-CM

## 2025-02-13 PROCEDURE — 99999 PR PBB SHADOW E&M-EST. PATIENT-LVL III: CPT | Mod: PBBFAC,,, | Performed by: NURSE PRACTITIONER

## 2025-02-13 PROCEDURE — 3008F BODY MASS INDEX DOCD: CPT | Mod: CPTII,,, | Performed by: NURSE PRACTITIONER

## 2025-02-13 PROCEDURE — 1159F MED LIST DOCD IN RCRD: CPT | Mod: CPTII,,, | Performed by: NURSE PRACTITIONER

## 2025-02-13 PROCEDURE — 99213 OFFICE O/P EST LOW 20 MIN: CPT | Mod: PBBFAC | Performed by: NURSE PRACTITIONER

## 2025-02-13 PROCEDURE — 99214 OFFICE O/P EST MOD 30 MIN: CPT | Mod: S$PBB,,, | Performed by: NURSE PRACTITIONER

## 2025-02-13 PROCEDURE — 3079F DIAST BP 80-89 MM HG: CPT | Mod: CPTII,,, | Performed by: NURSE PRACTITIONER

## 2025-02-13 PROCEDURE — 3075F SYST BP GE 130 - 139MM HG: CPT | Mod: CPTII,,, | Performed by: NURSE PRACTITIONER

## 2025-02-13 NOTE — PROGRESS NOTES
[facility] Note    Chief Complaint      Chief Complaint   Patient presents with    Annual Exam     School physical        History of Present Illness      Roger Putnam is a 24 y.o. male with chronic conditions of ADHD, panic attack, social anxiety disorder who presents today for: school physical. Had titers drawn previously at [facility]. But nothing visible in the system? Needs MMR booster, and Hep B. Plans to get Hep B at pharmacy but they did not have MMR. Needs physical form completion.     ADHD: no longer on medication, doing well.   Seizures: previously saw Dr. Mack, now sees Dr. Santoro no recent activity, controlled on Lamictal since 2019.    Anxiety: on cymblata, folic acid, and xanax as needed. Sees Dr. Moore (psyc) Controlled. Denies si/hi/panic attacks.     Past Medical History:  Past Medical History:   Diagnosis Date    ADHD (attention deficit hyperactivity disorder)     Panic attack     Seizures     Social anxiety disorder        Past Surgical History:   has no past surgical history on file.    Family History:  family history includes Alcohol abuse in his maternal grandmother; Arthritis in his mother; Asthma in his brother; COPD in his paternal grandmother; Cancer in his father; Drug abuse in his paternal uncle; Miscarriages / Stillbirths in his mother.     Social History:  Social History     Tobacco Use    Smoking status: Never    Smokeless tobacco: Never   Substance Use Topics    Alcohol use: No    Drug use: No       Review of Systems   Constitutional:  Negative for chills and fever.   Respiratory:  Negative for cough and shortness of breath.    Cardiovascular:  Negative for chest pain and palpitations.   Gastrointestinal:  Negative for constipation, diarrhea, nausea and vomiting.   Genitourinary:  Negative for dysuria and hematuria.   Musculoskeletal:  Negative for falls.   Neurological:  Negative for headaches.        Medications:  Outpatient Encounter Medications as of 2/13/2025    Medication Sig Dispense Refill    ALPRAZolam (XANAX) 0.25 MG tablet Take 0.25 mg by mouth 3 (three) times daily. Take only when needed for Anxiety      DULoxetine (CYMBALTA) 30 MG capsule TAKE ONE CAPSULE BY MOUTH EVERY DAY 90 capsule 3    fish oil-omega-3 fatty acids 300-1,000 mg capsule Take by mouth once daily.      fluticasone (FLONASE) 50 mcg/actuation nasal spray       lamoTRIgine (LAMICTAL) 150 MG Tab Take 1 tablet (150 mg total) by mouth 2 (two) times daily. 180 tablet 3    levomefolate calcium (ELFOLATE) 15 mg Tab Take 15 mg by mouth once daily. 90 tablet 3     No facility-administered encounter medications on file as of 2/13/2025.       Allergies:  Review of patient's allergies indicates:  No Known Allergies    Health Maintenance:  Immunization History   Administered Date(s) Administered    COVID-19, MRNA, LN-S, PF (Pfizer) (Purple Cap) 03/27/2021, 04/17/2021, 12/18/2021    DTaP (5 Pertussis Antigens) 1998, 1998, 1998, 11/22/1999, 07/30/2002    HIB 1998, 1998, 1998, 11/22/1999    Hepatitis A, Pediatric/Adolescent, 2 Dose 06/24/2016    Hepatitis B 1998, 1998, 02/18/1999    IPV 1998, 1998, 07/30/2002    Influenza - Quadrivalent - PF *Preferred* (6 months and older) 12/12/2019, 09/07/2020, 10/22/2022    MMR 04/26/1999, 07/30/2002    Meningococcal Conjugate (MCV4P) 07/20/2009, 06/24/2016    OPV 04/26/1999    PPD Test 02/18/1999    Td - PF (ADULT) 12/12/2019    Tdap 07/20/2009    Varicella 04/26/1999, 07/20/2009      Health Maintenance   Topic Date Due    Hepatitis C Screening  Never done    HIV Screening  Never done    HPV Vaccines (1 - Male 3-dose series) Never done    COVID-19 Vaccine (4 - 2024-25 season) 09/01/2024    TETANUS VACCINE  12/12/2029    RSV Vaccine (Age 60+ and Pregnant patients) (1 - 1-dose 75+ series) 04/02/2073    Influenza Vaccine  Completed    Lipid Panel  Completed    Pneumococcal Vaccines (Age 0-49)  Aged Out        Physical  Exam      Vital Signs  Pulse: 81  SpO2: 98 %  BP: 130/80  Pain Score: 0-No pain  Height and Weight  Height: 6' (182.9 cm)  Weight: 75.3 kg (166 lb 0.1 oz)  BSA (Calculated - sq m): 1.96 sq meters  BMI (Calculated): 22.5  Weight in (lb) to have BMI = 25: 183.9]    Physical Exam  Constitutional:       Appearance: He is well-developed.   HENT:      Head: Normocephalic and atraumatic.      Right Ear: Tympanic membrane normal.      Left Ear: Tympanic membrane normal.      Nose: Nose normal.      Mouth/Throat:      Mouth: Mucous membranes are moist.   Eyes:      Pupils: Pupils are equal, round, and reactive to light.   Neck:      Thyroid: No thyromegaly.   Cardiovascular:      Rate and Rhythm: Normal rate and regular rhythm.      Pulses: Normal pulses.      Heart sounds: No murmur heard.  Pulmonary:      Effort: Pulmonary effort is normal. No respiratory distress.      Breath sounds: Normal breath sounds.   Abdominal:      General: There is no distension.      Palpations: Abdomen is soft.      Tenderness: There is no abdominal tenderness.   Musculoskeletal:         General: Normal range of motion.   Skin:     General: Skin is warm and dry.      Capillary Refill: Capillary refill takes less than 2 seconds.   Neurological:      General: No focal deficit present.      Mental Status: He is alert and oriented to person, place, and time.   Psychiatric:         Behavior: Behavior normal.          Laboratory:  CBC:  Recent Labs   Lab 03/14/24  0928   WBC 4.20   RBC 5.20   Hemoglobin 16.2   Hematocrit 48.7   Platelets 217   MCV 94   MCH 31.2 H   MCHC 33.3     CMP:  Recent Labs   Lab 03/14/24  0928   Glucose 87   Calcium 9.9   Albumin 4.4   Total Protein 7.2   Sodium 141   Potassium 4.4   CO2 25   Chloride 106   BUN 13   Alkaline Phosphatase 53 L   ALT 15   AST 22   Total Bilirubin 0.8     URINALYSIS:       LIPIDS:  Recent Labs   Lab 03/14/24  0928   TSH 1.027   HDL 43   Cholesterol 151   Triglycerides 60   LDL Cholesterol 96.0    HDL/Cholesterol Ratio 28.5   Non-HDL Cholesterol 108   Total Cholesterol/HDL Ratio 3.5     TSH:  Recent Labs   Lab 03/14/24  0928   TSH 1.027     A1C:        Assessment/Plan     Roger Putnam is a 24 y.o.male with:    1. Anxiety  Stable. Continue current meds.      2. Nonintractable generalized idiopathic epilepsy without status epilepticus  Stable. Continue current meds.  Continue follow up with speciaslits.     3. Attention deficit hyperactivity disorder (ADHD), unspecified ADHD type  Stable. Continue behavior methods.     Completed physical form, told pt to get us copy of his Hep B / and MMR immunity prior to receiving the vaccine.     Chronic conditions status updated as per HPI.  Other than changes above, cont current medications and maintain follow up with specialists.  No follow-ups on file.    No future appointments.      Adrienne Cotaya, FNP [facility]

## 2025-03-17 DIAGNOSIS — F41.9 ANXIETY: ICD-10-CM

## 2025-03-18 RX ORDER — LEVOMEFOLATE CALCIUM 15 MG
1 TABLET ORAL
Qty: 90 TABLET | Refills: 3 | Status: SHIPPED | OUTPATIENT
Start: 2025-03-18

## 2025-05-13 DIAGNOSIS — F41.9 ANXIETY: ICD-10-CM

## 2025-05-13 NOTE — TELEPHONE ENCOUNTER
Care Due:                  Date            Visit Type   Department     Provider  --------------------------------------------------------------------------------                                EP -                              PRIMARY      [facility]  Last Visit: 03-      CARE ([facility])   NADIR Esquivel  Next Visit: None Scheduled  None         None Found                                                            Last  Test          Frequency    Reason                     Performed    Due Date  --------------------------------------------------------------------------------    Office Visit  15 months..  DULoxetine...............  03- 06-    Cr..........  12 months..  DULoxetine...............  03-   03-    [facility] Embedded Care Due Messages. Reference number: [id number].   5/13/2025 6:08:17 PM CDT

## 2025-05-14 RX ORDER — DULOXETIN HYDROCHLORIDE 30 MG/1
30 CAPSULE, DELAYED RELEASE ORAL
Qty: 90 CAPSULE | Refills: 0 | Status: SHIPPED | OUTPATIENT
Start: 2025-05-14

## 2025-05-14 NOTE — TELEPHONE ENCOUNTER
Refill Routing Note   Medication(s) are not appropriate for processing by [facility] for the following reason(s):        Required labs outdated    [facility] action(s):  Defer   Requires labs : Yes     Requires appointment : Yes             Appointments  past 12m or future 3m with PCP    Date Provider   Last Visit   3/8/2024 Devin Frias MD   Next Visit   Visit date not found Devin Frias MD   ED visits in past 90 days: 0        Note composed:7:02 PM 05/13/2025

## 2025-07-20 DIAGNOSIS — F41.9 ANXIETY: ICD-10-CM

## 2025-07-21 RX ORDER — DULOXETIN HYDROCHLORIDE 30 MG/1
30 CAPSULE, DELAYED RELEASE ORAL
Qty: 90 CAPSULE | Refills: 3 | Status: SHIPPED | OUTPATIENT
Start: 2025-07-21

## 2025-07-30 ENCOUNTER — OFFICE VISIT (OUTPATIENT)
Dept: URGENT CARE | Facility: CLINIC | Age: 25
End: 2025-07-30
Payer: COMMERCIAL

## 2025-07-30 VITALS
SYSTOLIC BLOOD PRESSURE: 130 MMHG | WEIGHT: 166 LBS | RESPIRATION RATE: 18 BRPM | BODY MASS INDEX: 22.48 KG/M2 | HEIGHT: 72 IN | DIASTOLIC BLOOD PRESSURE: 80 MMHG | HEART RATE: 88 BPM | OXYGEN SATURATION: 97 % | TEMPERATURE: 99 F

## 2025-07-30 DIAGNOSIS — U07.1 COVID-19: Primary | ICD-10-CM

## 2025-07-30 DIAGNOSIS — R50.9 FEVER, UNSPECIFIED FEVER CAUSE: ICD-10-CM

## 2025-07-30 LAB
CTP QC/QA: YES
SARS-COV+SARS-COV-2 AG RESP QL IA.RAPID: POSITIVE

## 2025-07-30 PROCEDURE — 87811 SARS-COV-2 COVID19 W/OPTIC: CPT | Mod: QW,S$GLB,,

## 2025-07-30 PROCEDURE — 99214 OFFICE O/P EST MOD 30 MIN: CPT | Mod: S$GLB,,,

## 2025-07-30 RX ORDER — CETIRIZINE HYDROCHLORIDE 10 MG/1
10 TABLET ORAL DAILY
Qty: 30 TABLET | Refills: 0 | Status: SHIPPED | OUTPATIENT
Start: 2025-07-30 | End: 2025-08-29

## 2025-07-30 NOTE — LETTER
July 30, 2025      [facility] [place] [address]  [place] [address]  Phone: [phone]  Fax: [phone]       Patient: Roger Putnam   YOB: 2000  Date of Visit: 07/30/2025    To Whom It May Concern:    Brent Putnam  was at [facility] on 07/30/2025. The patient may return to work/school on 08/05/2025 with no restrictions. If you have any questions or concerns, or if I can be of further assistance, please do not hesitate to contact me.    Sincerely,          RAE CamC     
PAST SURGICAL HISTORY:  History of appendectomy

## 2025-07-30 NOTE — PATIENT INSTRUCTIONS
Quarantine for 24 hours fever free, 24 hours feeling better.   Continue taking Flonase    Tylenol or Motrin for pain or fever greater than 100.4 F.  Increase humidity  Saltwater gargles  Hot tea with honey  Take medicine as prescribed    Return to clinic for no improvement in symptoms.  Report to the emergency room for worsening of symptoms.    You are having so much trouble breathing that you can only say one or two words at a time.  You need to sit upright at all times to be able to breathe and/or cannot lie down.  You are very confused or cannot stay awake.  Your lips or skin start to turn blue or grey.  You think you might be having a medical emergency. Some examples of medical emergencies are:  Severe chest pain.  Not able to speak or move normally.  You have trouble breathing when talking or sitting still.  You have new shortness of breath.  You become weak or dizzy.  You have very dark urine or do not pass urine for more than 8 hours.  You have new or worsening COVID-19 symptoms like:  Fever  Cough  Feeling very tired  Shaking chills  Headache  Trouble swallowing  Throwing up  Loose stools  Reddish purple spots on your fingers or toes

## 2025-07-30 NOTE — PROGRESS NOTES
Subjective:      Patient ID: Roger Putnam is a 25 y.o. male.    Vitals:  height is 6' (1.829 m) and weight is 75.3 kg (166 lb). His oral temperature is 99.3 °F (37.4 °C). His blood pressure is 130/80 and his pulse is 88. His respiration is 18 and oxygen saturation is 97%.     Chief Complaint: Fever (Fever, headache, congestion. Started 1 day ago - Entered by patient)    24 yo male c/o fever (100.5 F this morning), nausea, headache, sinus congestion, and myalgia. Sx began yesterday. Pt concerned for COVID. Pt self medicated with tylenol, pain level is a 3.    Fever   This is a new problem. The current episode started 1 day ago. The problem occurs cycles. The problem has been unchanged. He has not experienced a heat injury.The maximum temperature noted was 100 to 100.9 F. The temperature was taken using an oral thermometer. Associated symptoms include congestion, muscle aches and nausea. Pertinent negatives include no abdominal pain, coughing, diarrhea, ear pain, headaches, not playful when afebrile, rash, sleepiness, sore throat, urinary pain, vomiting or wheezing. He has tried acetaminophen for the symptoms. The treatment provided mild relief.       Constitution: Positive for fever.   HENT:  Positive for congestion. Negative for ear pain and sore throat.    Respiratory:  Negative for cough and wheezing.    Gastrointestinal:  Positive for nausea. Negative for abdominal pain, vomiting and diarrhea.   Skin:  Negative for rash.   Neurological:  Negative for headaches.      Objective:     Physical Exam   Constitutional: He is oriented to person, place, and time.  Non-toxic appearance. He does not appear ill. No distress.   HENT:   Ears:   Right Ear: Tympanic membrane normal.   Left Ear: Tympanic membrane normal.   Nose: Right sinus exhibits no maxillary sinus tenderness and no frontal sinus tenderness. Left sinus exhibits no maxillary sinus tenderness and no frontal sinus tenderness.   Neck: Neck supple. Carotid bruit  is not present. No neck rigidity present.   Cardiovascular: Normal rate, regular rhythm and normal heart sounds.   Pulmonary/Chest: Effort normal and breath sounds normal. No stridor. No respiratory distress. He has no wheezes. He has no rhonchi. He has no rales. He exhibits no tenderness.   Musculoskeletal:      Cervical back: He exhibits no tenderness.   Lymphadenopathy:     He has no cervical adenopathy.   Neurological: He is alert and oriented to person, place, and time.   Skin: Skin is warm, dry and not diaphoretic.   Psychiatric: His behavior is normal. Mood, judgment and thought content normal.       Assessment:     1. COVID-19    2. Fever, unspecified fever cause        Plan:     Patient Instructions   Quarantine for 24 hours fever free, 24 hours feeling better.     Tylenol or Motrin for pain or fever greater than 100.4 F.  Increase humidity  Saltwater gargles  Hot tea with honey  Take medicine as prescribed    Return to clinic for no improvement in symptoms.  Report to the emergency room for worsening of symptoms.    You are having so much trouble breathing that you can only say one or two words at a time.  You need to sit upright at all times to be able to breathe and/or cannot lie down.  You are very confused or cannot stay awake.  Your lips or skin start to turn blue or grey.  You think you might be having a medical emergency. Some examples of medical emergencies are:  Severe chest pain.  Not able to speak or move normally.  You have trouble breathing when talking or sitting still.  You have new shortness of breath.  You become weak or dizzy.  You have very dark urine or do not pass urine for more than 8 hours.  You have new or worsening COVID-19 symptoms like:  Fever  Cough  Feeling very tired  Shaking chills  Headache  Trouble swallowing  Throwing up  Loose stools  Reddish purple spots on your fingers or toes      COVID-19    Fever, unspecified fever cause  -     SARS Coronavirus 2 Antigen, POCT Manual  Read    Other orders  -     cetirizine (ZYRTEC) 10 MG tablet; Take 1 tablet (10 mg total) by mouth once daily.  Dispense: 30 tablet; Refill: 0      Reports going out of town, called off work yesterday. Will return to work next Tuesday.